# Patient Record
Sex: FEMALE | Race: ASIAN | NOT HISPANIC OR LATINO | Employment: FULL TIME | URBAN - METROPOLITAN AREA
[De-identification: names, ages, dates, MRNs, and addresses within clinical notes are randomized per-mention and may not be internally consistent; named-entity substitution may affect disease eponyms.]

---

## 2022-05-13 ENCOUNTER — APPOINTMENT (OUTPATIENT)
Dept: GENERAL RADIOLOGY | Facility: HOSPITAL | Age: 57
End: 2022-05-13

## 2022-05-13 ENCOUNTER — HOSPITAL ENCOUNTER (OUTPATIENT)
Facility: HOSPITAL | Age: 57
Setting detail: OBSERVATION
Discharge: HOME | End: 2022-05-16
Attending: EMERGENCY MEDICINE | Admitting: INTERNAL MEDICINE

## 2022-05-13 ENCOUNTER — OFFICE VISIT (OUTPATIENT)
Dept: INTERNAL MEDICINE | Age: 57
End: 2022-05-13

## 2022-05-13 ENCOUNTER — APPOINTMENT (OUTPATIENT)
Dept: CT IMAGING | Facility: HOSPITAL | Age: 57
End: 2022-05-13

## 2022-05-13 VITALS
TEMPERATURE: 98 F | HEART RATE: 110 BPM | HEIGHT: 59 IN | BODY MASS INDEX: 19.15 KG/M2 | DIASTOLIC BLOOD PRESSURE: 96 MMHG | OXYGEN SATURATION: 96 % | SYSTOLIC BLOOD PRESSURE: 144 MMHG | WEIGHT: 95 LBS

## 2022-05-13 DIAGNOSIS — F22 DELUSION: ICD-10-CM

## 2022-05-13 DIAGNOSIS — Z13.220 SCREENING FOR CHOLESTEROL LEVEL: ICD-10-CM

## 2022-05-13 DIAGNOSIS — R41.82 ALTERED MENTAL STATUS, UNSPECIFIED ALTERED MENTAL STATUS TYPE: Primary | ICD-10-CM

## 2022-05-13 DIAGNOSIS — E55.9 VITAMIN D DEFICIENCY: ICD-10-CM

## 2022-05-13 DIAGNOSIS — Z13.1 SCREENING FOR DIABETES MELLITUS: ICD-10-CM

## 2022-05-13 DIAGNOSIS — E11.65 UNCONTROLLED TYPE 2 DIABETES MELLITUS WITH HYPERGLYCEMIA: ICD-10-CM

## 2022-05-13 DIAGNOSIS — Z11.59 NEED FOR HEPATITIS C SCREENING TEST: ICD-10-CM

## 2022-05-13 DIAGNOSIS — R03.0 ELEVATED BP WITHOUT DIAGNOSIS OF HYPERTENSION: ICD-10-CM

## 2022-05-13 DIAGNOSIS — R44.3 HALLUCINATION: ICD-10-CM

## 2022-05-13 DIAGNOSIS — R44.0 AUDITORY HALLUCINATIONS: ICD-10-CM

## 2022-05-13 DIAGNOSIS — R00.0 TACHYCARDIA: Primary | ICD-10-CM

## 2022-05-13 LAB
ALBUMIN SERPL-MCNC: 4.8 G/DL (ref 3.5–5.2)
ALBUMIN/GLOB SERPL: 1.6 G/DL
ALP SERPL-CCNC: 101 U/L (ref 39–117)
ALT SERPL W P-5'-P-CCNC: 24 U/L (ref 1–33)
AMPHET+METHAMPHET UR QL: NEGATIVE
ANION GAP SERPL CALCULATED.3IONS-SCNC: 17.6 MMOL/L (ref 5–15)
AST SERPL-CCNC: 20 U/L (ref 1–32)
BACTERIA UR QL AUTO: NORMAL /HPF
BARBITURATES UR QL SCN: NEGATIVE
BASOPHILS # BLD AUTO: 0.04 10*3/MM3 (ref 0–0.2)
BASOPHILS NFR BLD AUTO: 0.4 % (ref 0–1.5)
BENZODIAZ UR QL SCN: NEGATIVE
BILIRUB SERPL-MCNC: 0.7 MG/DL (ref 0–1.2)
BILIRUB UR QL STRIP: NEGATIVE
BUN SERPL-MCNC: 12 MG/DL (ref 6–20)
BUN/CREAT SERPL: 18.2 (ref 7–25)
CALCIUM SPEC-SCNC: 10.3 MG/DL (ref 8.6–10.5)
CANNABINOIDS SERPL QL: NEGATIVE
CHLORIDE SERPL-SCNC: 102 MMOL/L (ref 98–107)
CLARITY UR: CLEAR
CO2 SERPL-SCNC: 19.4 MMOL/L (ref 22–29)
COCAINE UR QL: NEGATIVE
COLOR UR: YELLOW
CREAT SERPL-MCNC: 0.66 MG/DL (ref 0.57–1)
DEPRECATED RDW RBC AUTO: 37.3 FL (ref 37–54)
EGFRCR SERPLBLD CKD-EPI 2021: 101.8 ML/MIN/1.73
EOSINOPHIL # BLD AUTO: 0 10*3/MM3 (ref 0–0.4)
EOSINOPHIL NFR BLD AUTO: 0 % (ref 0.3–6.2)
ERYTHROCYTE [DISTWIDTH] IN BLOOD BY AUTOMATED COUNT: 12.2 % (ref 12.3–15.4)
ETHANOL BLD-MCNC: <10 MG/DL (ref 0–10)
ETHANOL UR QL: <0.01 %
GLOBULIN UR ELPH-MCNC: 3 GM/DL
GLUCOSE BLDC GLUCOMTR-MCNC: 299 MG/DL (ref 70–130)
GLUCOSE SERPL-MCNC: 359 MG/DL (ref 65–99)
GLUCOSE UR STRIP-MCNC: ABNORMAL MG/DL
HCT VFR BLD AUTO: 44.9 % (ref 34–46.6)
HGB BLD-MCNC: 15.2 G/DL (ref 12–15.9)
HGB UR QL STRIP.AUTO: NEGATIVE
HYALINE CASTS UR QL AUTO: NORMAL /LPF
IMM GRANULOCYTES # BLD AUTO: 0.04 10*3/MM3 (ref 0–0.05)
IMM GRANULOCYTES NFR BLD AUTO: 0.4 % (ref 0–0.5)
KETONES UR QL STRIP: ABNORMAL
LEUKOCYTE ESTERASE UR QL STRIP.AUTO: NEGATIVE
LYMPHOCYTES # BLD AUTO: 1.89 10*3/MM3 (ref 0.7–3.1)
LYMPHOCYTES NFR BLD AUTO: 20.2 % (ref 19.6–45.3)
MCH RBC QN AUTO: 28.6 PG (ref 26.6–33)
MCHC RBC AUTO-ENTMCNC: 33.9 G/DL (ref 31.5–35.7)
MCV RBC AUTO: 84.6 FL (ref 79–97)
METHADONE UR QL SCN: NEGATIVE
MONOCYTES # BLD AUTO: 0.67 10*3/MM3 (ref 0.1–0.9)
MONOCYTES NFR BLD AUTO: 7.2 % (ref 5–12)
NEUTROPHILS NFR BLD AUTO: 6.72 10*3/MM3 (ref 1.7–7)
NEUTROPHILS NFR BLD AUTO: 71.8 % (ref 42.7–76)
NITRITE UR QL STRIP: NEGATIVE
NRBC BLD AUTO-RTO: 0 /100 WBC (ref 0–0.2)
OPIATES UR QL: NEGATIVE
OXYCODONE UR QL SCN: NEGATIVE
PH UR STRIP.AUTO: 6 [PH] (ref 5–8)
PLATELET # BLD AUTO: 293 10*3/MM3 (ref 140–450)
PMV BLD AUTO: 9.3 FL (ref 6–12)
POTASSIUM SERPL-SCNC: 3.7 MMOL/L (ref 3.5–5.2)
PROT SERPL-MCNC: 7.8 G/DL (ref 6–8.5)
PROT UR QL STRIP: ABNORMAL
QT INTERVAL: 337 MS
RBC # BLD AUTO: 5.31 10*6/MM3 (ref 3.77–5.28)
RBC # UR STRIP: NORMAL /HPF
REF LAB TEST METHOD: NORMAL
SARS-COV-2 RNA PNL SPEC NAA+PROBE: NOT DETECTED
SODIUM SERPL-SCNC: 139 MMOL/L (ref 136–145)
SP GR UR STRIP: >=1.03 (ref 1–1.03)
SQUAMOUS #/AREA URNS HPF: NORMAL /HPF
TROPONIN T SERPL-MCNC: <0.01 NG/ML (ref 0–0.03)
UROBILINOGEN UR QL STRIP: ABNORMAL
WBC # UR STRIP: NORMAL /HPF
WBC NRBC COR # BLD: 9.36 10*3/MM3 (ref 3.4–10.8)

## 2022-05-13 PROCEDURE — 80307 DRUG TEST PRSMV CHEM ANLYZR: CPT | Performed by: PHYSICIAN ASSISTANT

## 2022-05-13 PROCEDURE — 63710000001 INSULIN REGULAR HUMAN PER 5 UNITS: Performed by: PHYSICIAN ASSISTANT

## 2022-05-13 PROCEDURE — 25010000002 LORAZEPAM PER 2 MG: Performed by: EMERGENCY MEDICINE

## 2022-05-13 PROCEDURE — 84484 ASSAY OF TROPONIN QUANT: CPT | Performed by: PHYSICIAN ASSISTANT

## 2022-05-13 PROCEDURE — 71045 X-RAY EXAM CHEST 1 VIEW: CPT

## 2022-05-13 PROCEDURE — 93010 ELECTROCARDIOGRAM REPORT: CPT | Performed by: INTERNAL MEDICINE

## 2022-05-13 PROCEDURE — 85025 COMPLETE CBC W/AUTO DIFF WBC: CPT | Performed by: PHYSICIAN ASSISTANT

## 2022-05-13 PROCEDURE — 96374 THER/PROPH/DIAG INJ IV PUSH: CPT

## 2022-05-13 PROCEDURE — 81001 URINALYSIS AUTO W/SCOPE: CPT | Performed by: PHYSICIAN ASSISTANT

## 2022-05-13 PROCEDURE — G0378 HOSPITAL OBSERVATION PER HR: HCPCS

## 2022-05-13 PROCEDURE — 99204 OFFICE O/P NEW MOD 45 MIN: CPT

## 2022-05-13 PROCEDURE — 70450 CT HEAD/BRAIN W/O DYE: CPT

## 2022-05-13 PROCEDURE — 87635 SARS-COV-2 COVID-19 AMP PRB: CPT | Performed by: PHYSICIAN ASSISTANT

## 2022-05-13 PROCEDURE — 93000 ELECTROCARDIOGRAM COMPLETE: CPT

## 2022-05-13 PROCEDURE — 99285 EMERGENCY DEPT VISIT HI MDM: CPT

## 2022-05-13 PROCEDURE — 82962 GLUCOSE BLOOD TEST: CPT

## 2022-05-13 PROCEDURE — 90791 PSYCH DIAGNOSTIC EVALUATION: CPT

## 2022-05-13 PROCEDURE — 80053 COMPREHEN METABOLIC PANEL: CPT | Performed by: PHYSICIAN ASSISTANT

## 2022-05-13 PROCEDURE — C9803 HOPD COVID-19 SPEC COLLECT: HCPCS

## 2022-05-13 PROCEDURE — 82077 ASSAY SPEC XCP UR&BREATH IA: CPT | Performed by: PHYSICIAN ASSISTANT

## 2022-05-13 PROCEDURE — 93005 ELECTROCARDIOGRAM TRACING: CPT | Performed by: PHYSICIAN ASSISTANT

## 2022-05-13 RX ORDER — DEXTROSE MONOHYDRATE 25 G/50ML
25 INJECTION, SOLUTION INTRAVENOUS
Status: DISCONTINUED | OUTPATIENT
Start: 2022-05-13 | End: 2022-05-16 | Stop reason: HOSPADM

## 2022-05-13 RX ORDER — LORAZEPAM 2 MG/ML
0.5 INJECTION INTRAMUSCULAR ONCE
Status: COMPLETED | OUTPATIENT
Start: 2022-05-13 | End: 2022-05-13

## 2022-05-13 RX ORDER — INSULIN LISPRO 100 [IU]/ML
0-9 INJECTION, SOLUTION INTRAVENOUS; SUBCUTANEOUS
Status: DISCONTINUED | OUTPATIENT
Start: 2022-05-14 | End: 2022-05-16 | Stop reason: HOSPADM

## 2022-05-13 RX ORDER — ACETAMINOPHEN 325 MG/1
650 TABLET ORAL EVERY 4 HOURS PRN
Status: DISCONTINUED | OUTPATIENT
Start: 2022-05-13 | End: 2022-05-16 | Stop reason: HOSPADM

## 2022-05-13 RX ORDER — SODIUM CHLORIDE 9 MG/ML
100 INJECTION, SOLUTION INTRAVENOUS CONTINUOUS
Status: DISCONTINUED | OUTPATIENT
Start: 2022-05-13 | End: 2022-05-16

## 2022-05-13 RX ORDER — NICOTINE POLACRILEX 4 MG
15 LOZENGE BUCCAL
Status: DISCONTINUED | OUTPATIENT
Start: 2022-05-13 | End: 2022-05-16 | Stop reason: HOSPADM

## 2022-05-13 RX ORDER — SODIUM CHLORIDE 0.9 % (FLUSH) 0.9 %
10 SYRINGE (ML) INJECTION AS NEEDED
Status: DISCONTINUED | OUTPATIENT
Start: 2022-05-13 | End: 2022-05-16 | Stop reason: HOSPADM

## 2022-05-13 RX ORDER — CALCIUM CARBONATE 200(500)MG
2 TABLET,CHEWABLE ORAL 2 TIMES DAILY PRN
Status: DISCONTINUED | OUTPATIENT
Start: 2022-05-13 | End: 2022-05-16 | Stop reason: HOSPADM

## 2022-05-13 RX ORDER — SODIUM CHLORIDE 0.9 % (FLUSH) 0.9 %
10 SYRINGE (ML) INJECTION EVERY 12 HOURS SCHEDULED
Status: DISCONTINUED | OUTPATIENT
Start: 2022-05-13 | End: 2022-05-16 | Stop reason: HOSPADM

## 2022-05-13 RX ORDER — ACETAMINOPHEN 650 MG/1
650 SUPPOSITORY RECTAL EVERY 4 HOURS PRN
Status: DISCONTINUED | OUTPATIENT
Start: 2022-05-13 | End: 2022-05-16 | Stop reason: HOSPADM

## 2022-05-13 RX ORDER — ONDANSETRON 4 MG/1
4 TABLET, FILM COATED ORAL EVERY 6 HOURS PRN
Status: DISCONTINUED | OUTPATIENT
Start: 2022-05-13 | End: 2022-05-16 | Stop reason: HOSPADM

## 2022-05-13 RX ORDER — ACETAMINOPHEN 160 MG/5ML
650 SOLUTION ORAL EVERY 4 HOURS PRN
Status: DISCONTINUED | OUTPATIENT
Start: 2022-05-13 | End: 2022-05-16 | Stop reason: HOSPADM

## 2022-05-13 RX ORDER — ONDANSETRON 2 MG/ML
4 INJECTION INTRAMUSCULAR; INTRAVENOUS EVERY 6 HOURS PRN
Status: DISCONTINUED | OUTPATIENT
Start: 2022-05-13 | End: 2022-05-16 | Stop reason: HOSPADM

## 2022-05-13 RX ADMIN — INSULIN HUMAN 5 UNITS: 100 INJECTION, SOLUTION PARENTERAL at 20:28

## 2022-05-13 RX ADMIN — LORAZEPAM 0.5 MG: 2 INJECTION INTRAMUSCULAR; INTRAVENOUS at 19:11

## 2022-05-13 RX ADMIN — SODIUM CHLORIDE 1000 ML: 9 INJECTION, SOLUTION INTRAVENOUS at 20:25

## 2022-05-13 NOTE — ED PROVIDER NOTES
EMERGENCY DEPARTMENT ENCOUNTER    Room Number:  02/02  Date seen:  5/13/2022  Time seen: 18:49 EDT  PCP: Bushra Garza APRN  Historian: Daughter and       HPI:  Chief Complaint: hallucinations    A complete HPI/ROS/PMH/PSH/SH/FH are unobtainable due to: none    Context: Mery Beltran is a 56 y.o. female who presents to the ED for evaluation of hallucinations that gradually started developing over the last 2 weeks.  No history of this.  Patient was working at [employer] and was talking about hearing her coworkers talking badly about her.  And his daughter and  further assess the situation they realized that the things that the coworkers were reportedly saying were very personal insecurities that the patient had, it is unlikely that they knew about these.  She ended up quitting her job.  Things have escalated with increasing hallucinations and paranoia.  Believes that her  is going to kill her.  She denies having any homicidal or suicidal ideations.  Daughter states that patient has said that when they come to kill her that she will take pills to do it herself instead.  No history of drug or alcohol use or psychiatric illness.  Patient has not seen a doctor in years, does not take any medications other than some OTC supplements.  Mother of patient did have dementia.  Patient was seen by PCP today and was referred to the ER for further evaluation.  She was noted to have some abnormalities on baseline EKG at that visit.        PAST MEDICAL HISTORY  Active Ambulatory Problems     Diagnosis Date Noted   • No Active Ambulatory Problems     Resolved Ambulatory Problems     Diagnosis Date Noted   • No Resolved Ambulatory Problems     No Additional Past Medical History         PAST SURGICAL HISTORY  No past surgical history on file.      FAMILY HISTORY  Family History   Problem Relation Age of Onset   • Heart disease Mother    • Dementia Mother    • Lung disease Father    • No Known Problems Sister    •  No Known Problems Brother    • No Known Problems Daughter    • No Known Problems Son    • No Known Problems Son          SOCIAL HISTORY  Social History     Socioeconomic History   • Marital status:    Tobacco Use   • Smoking status: Never Smoker   • Smokeless tobacco: Never Used   Substance and Sexual Activity   • Alcohol use: Never   • Drug use: Never         ALLERGIES  Patient has no known allergies.        REVIEW OF SYSTEMS  Review of Systems   All systems reviewed and negative except for those discussed in HPI.       PHYSICAL EXAM  ED Triage Vitals   Temp Heart Rate Resp BP SpO2   05/13/22 1608 05/13/22 1607 05/13/22 1607 05/13/22 1607 05/13/22 1607   97.5 °F (36.4 °C) 94 16 142/93 97 %      Temp src Heart Rate Source Patient Position BP Location FiO2 (%)   05/13/22 1608 05/13/22 1607 -- -- --   Tympanic Monitor            GENERAL: Mild distress  HENT: atraumatic  EYES: no scleral icterus  CV: regular rhythm, regular rate  RESPIRATORY: normal effort CTA B  ABDOMEN: soft, nontender nondistended  MUSCULOSKELETAL: no deformity  NEURO: alert, moves all extremities, follows commands  PSYCH: depressed mood and affect, tearful pressured clear speech denies SI, denies HI  SKIN: warm, dry    Vital signs and nursing notes reviewed.          LAB RESULTS  Recent Results (from the past 24 hour(s))   Comprehensive Metabolic Panel    Collection Time: 05/13/22  7:08 PM    Specimen: Blood   Result Value Ref Range    Glucose 359 (H) 65 - 99 mg/dL    BUN 12 6 - 20 mg/dL    Creatinine 0.66 0.57 - 1.00 mg/dL    Sodium 139 136 - 145 mmol/L    Potassium 3.7 3.5 - 5.2 mmol/L    Chloride 102 98 - 107 mmol/L    CO2 19.4 (L) 22.0 - 29.0 mmol/L    Calcium 10.3 8.6 - 10.5 mg/dL    Total Protein 7.8 6.0 - 8.5 g/dL    Albumin 4.80 3.50 - 5.20 g/dL    ALT (SGPT) 24 1 - 33 U/L    AST (SGOT) 20 1 - 32 U/L    Alkaline Phosphatase 101 39 - 117 U/L    Total Bilirubin 0.7 0.0 - 1.2 mg/dL    Globulin 3.0 gm/dL    A/G Ratio 1.6 g/dL     BUN/Creatinine Ratio 18.2 7.0 - 25.0    Anion Gap 17.6 (H) 5.0 - 15.0 mmol/L    eGFR 101.8 >60.0 mL/min/1.73   Troponin    Collection Time: 05/13/22  7:08 PM    Specimen: Blood   Result Value Ref Range    Troponin T <0.010 0.000 - 0.030 ng/mL   Ethanol    Collection Time: 05/13/22  7:08 PM    Specimen: Blood   Result Value Ref Range    Ethanol <10 0 - 10 mg/dL    Ethanol % <0.010 %   CBC Auto Differential    Collection Time: 05/13/22  7:08 PM    Specimen: Blood   Result Value Ref Range    WBC 9.36 3.40 - 10.80 10*3/mm3    RBC 5.31 (H) 3.77 - 5.28 10*6/mm3    Hemoglobin 15.2 12.0 - 15.9 g/dL    Hematocrit 44.9 34.0 - 46.6 %    MCV 84.6 79.0 - 97.0 fL    MCH 28.6 26.6 - 33.0 pg    MCHC 33.9 31.5 - 35.7 g/dL    RDW 12.2 (L) 12.3 - 15.4 %    RDW-SD 37.3 37.0 - 54.0 fl    MPV 9.3 6.0 - 12.0 fL    Platelets 293 140 - 450 10*3/mm3    Neutrophil % 71.8 42.7 - 76.0 %    Lymphocyte % 20.2 19.6 - 45.3 %    Monocyte % 7.2 5.0 - 12.0 %    Eosinophil % 0.0 (L) 0.3 - 6.2 %    Basophil % 0.4 0.0 - 1.5 %    Immature Grans % 0.4 0.0 - 0.5 %    Neutrophils, Absolute 6.72 1.70 - 7.00 10*3/mm3    Lymphocytes, Absolute 1.89 0.70 - 3.10 10*3/mm3    Monocytes, Absolute 0.67 0.10 - 0.90 10*3/mm3    Eosinophils, Absolute 0.00 0.00 - 0.40 10*3/mm3    Basophils, Absolute 0.04 0.00 - 0.20 10*3/mm3    Immature Grans, Absolute 0.04 0.00 - 0.05 10*3/mm3    nRBC 0.0 0.0 - 0.2 /100 WBC   ECG 12 Lead    Collection Time: 05/13/22  7:49 PM   Result Value Ref Range    QT Interval 337 ms   Urinalysis With Microscopic If Indicated (No Culture) - Urine, Clean Catch    Collection Time: 05/13/22  8:23 PM    Specimen: Urine, Clean Catch   Result Value Ref Range    Color, UA Yellow Yellow, Straw    Appearance, UA Clear Clear    pH, UA 6.0 5.0 - 8.0    Specific Gravity, UA >=1.030 1.005 - 1.030    Glucose, UA >=1000 mg/dL (3+) (A) Negative    Ketones, UA 80 mg/dL (3+) (A) Negative    Bilirubin, UA Negative Negative    Blood, UA Negative Negative    Protein, UA  30 mg/dL (1+) (A) Negative    Leuk Esterase, UA Negative Negative    Nitrite, UA Negative Negative    Urobilinogen, UA 1.0 E.U./dL 0.2 - 1.0 E.U./dL   Urine Drug Screen - Urine, Clean Catch    Collection Time: 05/13/22  8:23 PM    Specimen: Urine, Clean Catch   Result Value Ref Range    Amphet/Methamphet, Screen Negative Negative    Barbiturates Screen, Urine Negative Negative    Benzodiazepine Screen, Urine Negative Negative    Cocaine Screen, Urine Negative Negative    Opiate Screen Negative Negative    THC, Screen, Urine Negative Negative    Methadone Screen, Urine Negative Negative    Oxycodone Screen, Urine Negative Negative   Urinalysis, Microscopic Only - Urine, Clean Catch    Collection Time: 05/13/22  8:23 PM    Specimen: Urine, Clean Catch   Result Value Ref Range    RBC, UA 0-2 None Seen, 0-2 /HPF    WBC, UA 0-2 None Seen, 0-2 /HPF    Bacteria, UA None Seen None Seen /HPF    Squamous Epithelial Cells, UA 0-2 None Seen, 0-2 /HPF    Hyaline Casts, UA 0-2 None Seen /LPF    Methodology Automated Microscopy    POC Glucose Once    Collection Time: 05/13/22  9:55 PM    Specimen: Blood   Result Value Ref Range    Glucose 299 (H) 70 - 130 mg/dL       Ordered the above labs and independently reviewed the results.        RADIOLOGY  CT Head Without Contrast   Preliminary Result   1. No acute process.       Radiation dose reduction techniques were utilized, including automated   exposure control and exposure modulation based on body size.              XR Chest 1 View   Final Result          I ordered the above noted radiological studies. Reviewed by me and discussed with radiologist.  See dictation for official radiology interpretation.    PROCEDURES  Procedures        MEDICATIONS GIVEN IN ER  Medications   LORazepam (ATIVAN) injection 0.5 mg (0.5 mg Intravenous Given 5/13/22 1911)   sodium chloride 0.9 % bolus 1,000 mL (0 mL Intravenous Stopped 5/13/22 2147)   insulin regular (humuLIN R,novoLIN R) injection 5 Units  (5 Units Subcutaneous Given 5/13/22 2028)             PROGRESS AND CONSULTS    Differential diagnosis for altered mental status includes but is not limited to:  - vital sign abnormalities such as HTN encephalopathy, hypotension, hypoxemia, hypercarbia, heat stroke  - toxic/metabolic pathology such as hypoglycemia, DKA, hypo/hyper-natremia, thyroid storm, myxedema coma, medication side effect (either intentional or accidental)  - infectious etiology  - intracranial pathology such as stroke, seizure, intracranial mass, intracranial hemorrhage  - psychiatric pathology      ED Course as of 05/13/22 2315   Fri May 13, 2022   1852 Medical chart reviewed.  Patient presented to the PCP office today to establish care.  She was tachycardic, referred to the ER for further evaluation for this and by psychiatry for hallucinations. [KA]   1952 EKG independently viewed and contemporaneously interpreted by ED physician. Time: 1949.  Rate 104.  Interpretation: Sinus tachycardia, normal axis, normal QRS, no ST elevation or depression, T wave inversion in inferior leads as well as V3 through V6. [JG]   2006 Glucose(!): 359   states she was told she needed to start medication for high blood sugar at least 10 years ago but did not [KA]   2056 Psychiatry at bedside. [JG]   2118 Glucose(!): 359 [KA]   2118 CO2(!): 19.4 [KA]   2118 Anion Gap(!): 17.6 [KA]   2119 Ketones, UA(!): 80 mg/dL (3+) [KA]   2119 Glucose(!): >=1000 mg/dL (3+) [KA]   2212 I discussed the patient with MICHI Núñez for LHA.  We discussed the patient's history presentation labs and imaging and she agrees to admit for further evaluation and treatment. [KA]   2310 I reassessed the patient multiple times.  She is much improved after the Ativan.  I discussed all of her results with her and family.  Plan to admit for uncontrolled type 2 diabetes with hyperglycemia, ketonuria and psychiatric evaluation and they are agreeable.  I discussed patient with Randee  behavioral health RN.  She is evaluated patient at the bedside. [KA]      ED Course User Index  [JG] Eran Mcclellan MD  [KA] Denisse Key PA             Patient was placed in face mask in first look. Patient was wearing facemask each time I entered the room and throughout our encounter. I wore protective equipment throughout this patient encounter including a face mask, eye shield and gloves. Hand hygiene was performed before donning protective equipment and after removal when leaving the room.        DIAGNOSIS  Final diagnoses:   Altered mental status, unspecified altered mental status type   Hallucination   Uncontrolled type 2 diabetes mellitus with hyperglycemia (HCC)   Delusion (HCC)       Latest Documented Vital Signs:  As of 23:15 EDT  BP- 124/74 HR- 94 Temp- 97.5 °F (36.4 °C) (Tympanic) O2 sat- 97%       Denisse Key PA  05/13/22 7530

## 2022-05-13 NOTE — ED PROVIDER NOTES
MD ATTESTATION NOTE  I wore full protective equipment throughout this patient encounter including a N95 face mask, googles, gown and gloves. Hand hygiene was performed before donning protective equipment and after removal when leaving the room.    The JANE and I have discussed this patient's history, physical exam, and treatment plan. I have reviewed the documentation and personally had a face to face interaction with the patient. I affirm the JANE documentation and agree with their diagnostics, findings, treatment, plan, and disposition.    I provided a substantive portion of the care of this patient.  I personally performed the physical exam, in its entirety.  The attached note describes my personal findings.    Mery Beltran is a 56 y.o. female who presents to the ED c/o hallucination and delusions.  History supplied by patient and patient's family.  Patient has been having hallucinations for the last week.  Patient believes that people have been talking about her.  Patient has been having paranoid delusions, believing that her  is going to kill her, that there are cameras inside her house.  Family states that patient did state 2 days ago that she would take pills to kill her self to avoid having someone kill her but was just in often, and she it was only set once.  Patient was evaluated by [facility] psychiatry yesterday although family is unaware of what was done there other than patient was discharged.  Patient has no psychiatric history, no chronic medical problems.  Family does report that patient has some mild paranoia at baseline.    On exam:  General: NAD.  Patient resting comfortably when I enter the room, this is status post benzo.  Patient arouses to voice.  Head: NCAT.  ENT: nares patent, no scleral icterus  Neck: Supple, trachea midline.  Cardiac: regular rate and rhythm.  Lungs: normal effort.  Abdomen: Soft, NTTP.   Extremities: Moves all extremities well, no peripheral edema  Neuro: alert, MAEW,  follows commands  Psych: calm, cooperative  Skin: Warm, dry.    Medical Decision Making:  After the initial H&P, I discussed pertinent information from history and physical exam with patient/family.  Discussed differential diagnosis.  Discussed plan for ED evaluation/work-up/treatment.  All questions answered.  Patient/family is agreeable with plan.    ED Course as of 05/14/22 1305   Fri May 13, 2022   1852 Medical chart reviewed.  Patient presented to the PCP office today to establish care.  She was tachycardic, referred to the ER for further evaluation for this and by psychiatry for hallucinations. [KA]   1952 EKG independently viewed and contemporaneously interpreted by ED physician. Time: 1949.  Rate 104.  Interpretation: Sinus tachycardia, normal axis, normal QRS, no ST elevation or depression, T wave inversion in inferior leads as well as V3 through V6. [JG]   2006 Glucose(!): 359   states she was told she needed to start medication for high blood sugar at least 10 years ago but did not [KA]   2056 Psychiatry at bedside. [JG]   2118 Glucose(!): 359 [KA]   2118 CO2(!): 19.4 [KA]   2118 Anion Gap(!): 17.6 [KA]   2119 Ketones, UA(!): 80 mg/dL (3+) [KA]   2119 Glucose(!): >=1000 mg/dL (3+) [KA]   2212 I discussed the patient with MICHI Núñez for LHA.  We discussed the patient's history presentation labs and imaging and she agrees to admit for further evaluation and treatment. [KA]   2310 I reassessed the patient multiple times.  She is much improved after the Ativan.  I discussed all of her results with her and family.  Plan to admit for uncontrolled type 2 diabetes with hyperglycemia, ketonuria and psychiatric evaluation and they are agreeable.  I discussed patient with Randee behavioral health RN.  She is evaluated patient at the bedside. [KA]      ED Course User Index  [JG] Eran Mcclellan MD  [KA] Denisse Key PA       Diagnosis  Final diagnoses:   Altered mental status, unspecified  altered mental status type   Hallucination   Uncontrolled type 2 diabetes mellitus with hyperglycemia (HCC)   Delusion (HCC)        Eran Mcclellan MD  05/13/22 0773       Eran Mcclellan MD  05/14/22 9458

## 2022-05-13 NOTE — PROGRESS NOTES
"    I N T E R N A L  M E D I C I N E  Bushra Garza, APRN    ENCOUNTER DATE:  05/13/2022    Mery Beltran / 56 y.o. / female      CHIEF COMPLAINT / REASON FOR OFFICE VISIT     Establish Care      ASSESSMENT & PLAN     Diagnoses and all orders for this visit:    1. Tachycardia (Primary)  -     ECG 12 Lead  -     ECG 12 Lead    2. Auditory hallucinations  -     Ambulatory Referral to Behavioral Health    3. Elevated BP without diagnosis of hypertension  -     CBC & Differential  -     Comprehensive Metabolic Panel  -     TSH+Free T4  -     Urinalysis With Microscopic If Indicated (No Culture) - Urine, Clean Catch  -     Vitamin B12    4. Screening for diabetes mellitus  -     Hemoglobin A1c    5. Need for hepatitis C screening test  -     Hepatitis C Antibody    6. Screening for cholesterol level  -     Lipid Panel With / Chol / HDL Ratio    7. Vitamin D deficiency  -     Vitamin D 25 Hydroxy         SUMMARY/DISCUSSION  • Given pt's tachycardia, ST and T wave changes present on the EKG, pt is being immediately transferred to ED for full cardiac evaluation.  • Will need full mental health evaluation by psychiatry; referral placed.  Pt and family educated on importance of monitoring for any SI/ HI, and for need to seek care at ED if present.   • Will need to obtain follow up blood pressure readings and treat hypertension, if readings remain elevated.  • Lab work up pending - pt received labs prior to obtaining EKG and transfer to ED.  • Pt will need to follow up next week after ED work up and to obtain a full physical.      Next Appointment with me: Visit date not found    Return in about 3 days (around 5/16/2022).      VITAL SIGNS     Visit Vitals  /96 (BP Location: Left arm)   Pulse 110   Temp 98 °F (36.7 °C)   Ht 149.9 cm (59\")   Wt 43.1 kg (95 lb)   SpO2 96%   BMI 19.19 kg/m²           @BP@  Wt Readings from Last 3 Encounters:   05/13/22 43.1 kg (95 lb)     Body mass index is 19.19 kg/m².        MEDICATIONS AT " "THE TIME OF OFFICE VISIT     No current outpatient medications on file prior to visit.     No current facility-administered medications on file prior to visit.        HISTORY OF PRESENT ILLNESS     Here today s/p follow up visit to [facility] ED yesterday for auditory hallucinations x 2 weeks.  Pt was diagnosed with adjustment disorder and prescribed hydroxyzine.  Referred to [facility].  Pt does not want to go to [facility] because \"I feel fine.\"  She does not want to take any medication.  She repeatedly denies any SI/ HI.    Pt is accompanied to today's appointment by her  and daughter.  Pt is withdrawn, tearful, and unable provide history at this time.  Per family, 2 weeks ago pt began to report she heard people gossip about her at work.  She quit her job at [employer] because of the gossip.  She told her  that she was \"going crazy\" and that she heard voices say they were going to kill her.  Per daughter, in the last two weeks, she has been argumentative and defensive.    Has not received medical care in two decades.  No prior medical information available at today's appointment.  Pt denies any pain anywhere at today's appointment.  Denies headache, vision changes, numbness, tingling, weakness, chest pain, dyspnea.  She is tachycardic and hypertensive.  Daughter reports she is eating well and drinks a lot of water daily.        Patient Care Team:  Bushra Garza APRN as PCP - General (Family Medicine)    REVIEW OF SYSTEMS     Review of Systems   Constitutional: Negative for appetite change, chills, fatigue and unexpected weight change.   Respiratory: Negative for cough, chest tightness and shortness of breath.    Cardiovascular: Negative for chest pain, palpitations and leg swelling.   Neurological: Negative for dizziness, weakness and headaches.   Psychiatric/Behavioral: Positive for agitation, dysphoric mood and hallucinations. Negative for self-injury and suicidal ideas. The patient is " nervous/anxious.           PHYSICAL EXAMINATION     Physical Exam  Vitals reviewed.   Constitutional:       General: She is not in acute distress.     Appearance: Normal appearance. She is not ill-appearing, toxic-appearing or diaphoretic.   HENT:      Head: Normocephalic and atraumatic.      Nose: Nose normal.   Eyes:      Conjunctiva/sclera: Conjunctivae normal.      Pupils: Pupils are equal, round, and reactive to light.   Cardiovascular:      Rate and Rhythm: Normal rate and regular rhythm.      Heart sounds: Normal heart sounds.   Pulmonary:      Effort: Pulmonary effort is normal.      Breath sounds: Normal breath sounds.   Abdominal:      General: Bowel sounds are normal.      Palpations: Abdomen is soft.   Musculoskeletal:         General: No swelling or deformity.   Skin:     General: Skin is warm and dry.   Neurological:      Mental Status: She is alert and oriented to person, place, and time. Mental status is at baseline.   Psychiatric:         Attention and Perception: She perceives auditory hallucinations. She does not perceive visual hallucinations.         Mood and Affect: Mood is anxious and depressed. Affect is flat and tearful.         Speech: Speech is delayed.         Behavior: Behavior is slowed and withdrawn. Behavior is cooperative.         Thought Content: Thought content is paranoid. Thought content does not include homicidal or suicidal ideation. Thought content does not include homicidal or suicidal plan.           REVIEWED DATA     Labs:           Imaging:       ECG 12 Lead    Date/Time: 5/13/2022 4:00 PM  Performed by: Bushra Garza APRN  Authorized by: Bushra Garza APRN   Comparison: not compared with previous ECG   Rhythm: sinus tachycardia    Clinical impression: abnormal EKG and poor quality tracing that precludes reliable interpretation - Repeat ECG  Comments: Sinus tachycardia with T wave/ ST changes present.  Will need to repeat EKG in ED.              Medical Tests:            Summary of old records / correspondence / consultant report:           Request outside records:

## 2022-05-13 NOTE — ED TRIAGE NOTES
Pt arrived from [facility] office accompanied by daughter. Daughter reports pt has had auditory hallucinations x 2wks.     Pt was wearing a mask during assessment.  This RN wore appropriate PPE

## 2022-05-14 PROBLEM — R44.0 AUDITORY HALLUCINATIONS: Status: ACTIVE | Noted: 2022-05-14

## 2022-05-14 PROBLEM — E11.65 TYPE 2 DIABETES MELLITUS WITH HYPERGLYCEMIA: Status: ACTIVE | Noted: 2022-05-14

## 2022-05-14 LAB
25(OH)D3+25(OH)D2 SERPL-MCNC: 23.4 NG/ML (ref 30–100)
ALBUMIN SERPL-MCNC: 5 G/DL (ref 3.8–4.9)
ALBUMIN/GLOB SERPL: 2.2 {RATIO} (ref 1.2–2.2)
ALP SERPL-CCNC: 110 IU/L (ref 44–121)
ALT SERPL-CCNC: 25 IU/L (ref 0–32)
AMMONIA BLD-SCNC: 30 UMOL/L (ref 11–51)
AMPHET+METHAMPHET UR QL: NEGATIVE
ANION GAP SERPL CALCULATED.3IONS-SCNC: 12 MMOL/L (ref 5–15)
APPEARANCE UR: CLEAR
AST SERPL-CCNC: 21 IU/L (ref 0–40)
BARBITURATES UR QL SCN: NEGATIVE
BASOPHILS # BLD AUTO: 0 X10E3/UL (ref 0–0.2)
BASOPHILS NFR BLD AUTO: 1 %
BENZODIAZ UR QL SCN: NEGATIVE
BILIRUB SERPL-MCNC: 0.6 MG/DL (ref 0–1.2)
BILIRUB UR QL STRIP: NEGATIVE
BUN SERPL-MCNC: 11 MG/DL (ref 6–24)
BUN SERPL-MCNC: 15 MG/DL (ref 6–20)
BUN/CREAT SERPL: 17 (ref 9–23)
BUN/CREAT SERPL: 25.9 (ref 7–25)
CALCIUM SERPL-MCNC: 10.2 MG/DL (ref 8.7–10.2)
CALCIUM SPEC-SCNC: 9.2 MG/DL (ref 8.6–10.5)
CANNABINOIDS SERPL QL: NEGATIVE
CHLORIDE SERPL-SCNC: 100 MMOL/L (ref 96–106)
CHLORIDE SERPL-SCNC: 107 MMOL/L (ref 98–107)
CHOLEST SERPL-MCNC: 310 MG/DL (ref 100–199)
CHOLEST/HDLC SERPL: 5.5 RATIO (ref 0–4.4)
CO2 SERPL-SCNC: 21 MMOL/L (ref 20–29)
CO2 SERPL-SCNC: 26 MMOL/L (ref 22–29)
COCAINE UR QL: NEGATIVE
COLOR UR: YELLOW
CREAT SERPL-MCNC: 0.58 MG/DL (ref 0.57–1)
CREAT SERPL-MCNC: 0.66 MG/DL (ref 0.57–1)
DEPRECATED RDW RBC AUTO: 40.4 FL (ref 37–54)
EGFRCR SERPLBLD CKD-EPI 2021: 102 ML/MIN/1.73
EGFRCR SERPLBLD CKD-EPI 2021: 105 ML/MIN/1.73
EOSINOPHIL # BLD AUTO: 0 X10E3/UL (ref 0–0.4)
EOSINOPHIL NFR BLD AUTO: 0 %
ERYTHROCYTE [DISTWIDTH] IN BLOOD BY AUTOMATED COUNT: 12.3 % (ref 11.7–15.4)
ERYTHROCYTE [DISTWIDTH] IN BLOOD BY AUTOMATED COUNT: 12.5 % (ref 12.3–15.4)
FOLATE SERPL-MCNC: 15.4 NG/ML (ref 4.78–24.2)
GLOBULIN SER CALC-MCNC: 2.3 G/DL (ref 1.5–4.5)
GLUCOSE BLDC GLUCOMTR-MCNC: 148 MG/DL (ref 70–130)
GLUCOSE BLDC GLUCOMTR-MCNC: 180 MG/DL (ref 70–130)
GLUCOSE BLDC GLUCOMTR-MCNC: 280 MG/DL (ref 70–130)
GLUCOSE SERPL-MCNC: 293 MG/DL (ref 65–99)
GLUCOSE SERPL-MCNC: 344 MG/DL (ref 65–99)
GLUCOSE UR QL STRIP: ABNORMAL
HBA1C MFR BLD: 10.5 % (ref 4.8–5.6)
HBA1C MFR BLD: 10.8 % (ref 4.8–5.6)
HCT VFR BLD AUTO: 41 % (ref 34–46.6)
HCT VFR BLD AUTO: 44.2 % (ref 34–46.6)
HCV AB S/CO SERPL IA: <0.1 S/CO RATIO (ref 0–0.9)
HDLC SERPL-MCNC: 56 MG/DL
HGB BLD-MCNC: 13.1 G/DL (ref 12–15.9)
HGB BLD-MCNC: 14.8 G/DL (ref 11.1–15.9)
HGB UR QL STRIP: NEGATIVE
IMM GRANULOCYTES # BLD AUTO: 0 X10E3/UL (ref 0–0.1)
IMM GRANULOCYTES NFR BLD AUTO: 0 %
KETONES UR QL STRIP: ABNORMAL
LDLC SERPL CALC-MCNC: 239 MG/DL (ref 0–99)
LEUKOCYTE ESTERASE UR QL STRIP: NEGATIVE
LYMPHOCYTES # BLD AUTO: 1.3 X10E3/UL (ref 0.7–3.1)
LYMPHOCYTES NFR BLD AUTO: 17 %
MAGNESIUM SERPL-MCNC: 2.1 MG/DL (ref 1.6–2.6)
MCH RBC QN AUTO: 28.1 PG (ref 26.6–33)
MCH RBC QN AUTO: 28.3 PG (ref 26.6–33)
MCHC RBC AUTO-ENTMCNC: 32 G/DL (ref 31.5–35.7)
MCHC RBC AUTO-ENTMCNC: 33.5 G/DL (ref 31.5–35.7)
MCV RBC AUTO: 85 FL (ref 79–97)
MCV RBC AUTO: 87.8 FL (ref 79–97)
METHADONE UR QL SCN: NEGATIVE
MICRO URNS: ABNORMAL
MONOCYTES # BLD AUTO: 0.5 X10E3/UL (ref 0.1–0.9)
MONOCYTES NFR BLD AUTO: 7 %
NEUTROPHILS # BLD AUTO: 5.8 X10E3/UL (ref 1.4–7)
NEUTROPHILS NFR BLD AUTO: 75 %
NITRITE UR QL STRIP: NEGATIVE
OPIATES UR QL: NEGATIVE
OXYCODONE UR QL SCN: NEGATIVE
PH UR STRIP: 6 [PH] (ref 5–7.5)
PLATELET # BLD AUTO: 196 10*3/MM3 (ref 140–450)
PLATELET # BLD AUTO: 274 X10E3/UL (ref 150–450)
PMV BLD AUTO: 9.1 FL (ref 6–12)
POTASSIUM SERPL-SCNC: 3.3 MMOL/L (ref 3.5–5.2)
POTASSIUM SERPL-SCNC: 3.8 MMOL/L (ref 3.5–5.2)
PROT SERPL-MCNC: 7.3 G/DL (ref 6–8.5)
PROT UR QL STRIP: ABNORMAL
RBC # BLD AUTO: 4.67 10*6/MM3 (ref 3.77–5.28)
RBC # BLD AUTO: 5.23 X10E6/UL (ref 3.77–5.28)
RPR SER QL: NORMAL
SODIUM SERPL-SCNC: 142 MMOL/L (ref 134–144)
SODIUM SERPL-SCNC: 145 MMOL/L (ref 136–145)
SP GR UR STRIP: >=1.03 (ref 1–1.03)
T4 FREE SERPL-MCNC: 1.39 NG/DL (ref 0.82–1.77)
TRIGL SERPL-MCNC: 89 MG/DL (ref 0–149)
TSH SERPL DL<=0.005 MIU/L-ACNC: 3.24 UIU/ML (ref 0.45–4.5)
UROBILINOGEN UR STRIP-MCNC: 0.2 MG/DL (ref 0.2–1)
VIT B12 SERPL-MCNC: 1314 PG/ML (ref 232–1245)
VLDLC SERPL CALC-MCNC: 15 MG/DL (ref 5–40)
WBC # BLD AUTO: 7.7 X10E3/UL (ref 3.4–10.8)
WBC NRBC COR # BLD: 9.59 10*3/MM3 (ref 3.4–10.8)

## 2022-05-14 PROCEDURE — 86592 SYPHILIS TEST NON-TREP QUAL: CPT | Performed by: INTERNAL MEDICINE

## 2022-05-14 PROCEDURE — 80307 DRUG TEST PRSMV CHEM ANLYZR: CPT | Performed by: NURSE PRACTITIONER

## 2022-05-14 PROCEDURE — 82962 GLUCOSE BLOOD TEST: CPT

## 2022-05-14 PROCEDURE — 83036 HEMOGLOBIN GLYCOSYLATED A1C: CPT | Performed by: NURSE PRACTITIONER

## 2022-05-14 PROCEDURE — 83735 ASSAY OF MAGNESIUM: CPT | Performed by: INTERNAL MEDICINE

## 2022-05-14 PROCEDURE — 63710000001 INSULIN LISPRO (HUMAN) PER 5 UNITS: Performed by: NURSE PRACTITIONER

## 2022-05-14 PROCEDURE — G0378 HOSPITAL OBSERVATION PER HR: HCPCS

## 2022-05-14 PROCEDURE — 99214 OFFICE O/P EST MOD 30 MIN: CPT | Performed by: STUDENT IN AN ORGANIZED HEALTH CARE EDUCATION/TRAINING PROGRAM

## 2022-05-14 PROCEDURE — 80048 BASIC METABOLIC PNL TOTAL CA: CPT | Performed by: NURSE PRACTITIONER

## 2022-05-14 PROCEDURE — 96376 TX/PRO/DX INJ SAME DRUG ADON: CPT

## 2022-05-14 PROCEDURE — 82746 ASSAY OF FOLIC ACID SERUM: CPT | Performed by: INTERNAL MEDICINE

## 2022-05-14 PROCEDURE — 96361 HYDRATE IV INFUSION ADD-ON: CPT

## 2022-05-14 PROCEDURE — 36415 COLL VENOUS BLD VENIPUNCTURE: CPT | Performed by: NURSE PRACTITIONER

## 2022-05-14 PROCEDURE — 82140 ASSAY OF AMMONIA: CPT | Performed by: INTERNAL MEDICINE

## 2022-05-14 PROCEDURE — 25010000002 LORAZEPAM PER 2 MG: Performed by: NURSE PRACTITIONER

## 2022-05-14 PROCEDURE — 85027 COMPLETE CBC AUTOMATED: CPT | Performed by: NURSE PRACTITIONER

## 2022-05-14 RX ORDER — POTASSIUM CHLORIDE 1.5 G/1.77G
40 POWDER, FOR SOLUTION ORAL AS NEEDED
Status: DISCONTINUED | OUTPATIENT
Start: 2022-05-14 | End: 2022-05-16 | Stop reason: HOSPADM

## 2022-05-14 RX ORDER — POTASSIUM CHLORIDE 750 MG/1
40 TABLET, FILM COATED, EXTENDED RELEASE ORAL AS NEEDED
Status: DISCONTINUED | OUTPATIENT
Start: 2022-05-14 | End: 2022-05-16 | Stop reason: HOSPADM

## 2022-05-14 RX ORDER — DOCUSATE SODIUM 100 MG/1
200 CAPSULE, LIQUID FILLED ORAL 2 TIMES DAILY
Status: DISCONTINUED | OUTPATIENT
Start: 2022-05-14 | End: 2022-05-16 | Stop reason: HOSPADM

## 2022-05-14 RX ORDER — MAGNESIUM SULFATE HEPTAHYDRATE 40 MG/ML
2 INJECTION, SOLUTION INTRAVENOUS AS NEEDED
Status: DISCONTINUED | OUTPATIENT
Start: 2022-05-14 | End: 2022-05-16 | Stop reason: HOSPADM

## 2022-05-14 RX ORDER — LORAZEPAM 2 MG/ML
1 INJECTION INTRAMUSCULAR EVERY 4 HOURS PRN
Status: DISCONTINUED | OUTPATIENT
Start: 2022-05-14 | End: 2022-05-16 | Stop reason: HOSPADM

## 2022-05-14 RX ORDER — OLANZAPINE 5 MG/1
5 TABLET ORAL NIGHTLY
Status: DISCONTINUED | OUTPATIENT
Start: 2022-05-14 | End: 2022-05-16 | Stop reason: HOSPADM

## 2022-05-14 RX ORDER — BISACODYL 10 MG
10 SUPPOSITORY, RECTAL RECTAL DAILY PRN
Status: DISCONTINUED | OUTPATIENT
Start: 2022-05-14 | End: 2022-05-16 | Stop reason: HOSPADM

## 2022-05-14 RX ORDER — POLYETHYLENE GLYCOL 3350 17 G/17G
17 POWDER, FOR SOLUTION ORAL DAILY
Status: DISCONTINUED | OUTPATIENT
Start: 2022-05-14 | End: 2022-05-16 | Stop reason: HOSPADM

## 2022-05-14 RX ORDER — MAGNESIUM SULFATE HEPTAHYDRATE 40 MG/ML
4 INJECTION, SOLUTION INTRAVENOUS AS NEEDED
Status: DISCONTINUED | OUTPATIENT
Start: 2022-05-14 | End: 2022-05-16 | Stop reason: HOSPADM

## 2022-05-14 RX ADMIN — INSULIN LISPRO 6 UNITS: 100 INJECTION, SOLUTION INTRAVENOUS; SUBCUTANEOUS at 11:34

## 2022-05-14 RX ADMIN — POLYETHYLENE GLYCOL 3350 17 G: 17 POWDER, FOR SOLUTION ORAL at 13:20

## 2022-05-14 RX ADMIN — POTASSIUM CHLORIDE 40 MEQ: 10 TABLET, EXTENDED RELEASE ORAL at 20:00

## 2022-05-14 RX ADMIN — SODIUM CHLORIDE 100 ML/HR: 9 INJECTION, SOLUTION INTRAVENOUS at 10:33

## 2022-05-14 RX ADMIN — INSULIN LISPRO 2 UNITS: 100 INJECTION, SOLUTION INTRAVENOUS; SUBCUTANEOUS at 21:43

## 2022-05-14 RX ADMIN — INSULIN LISPRO 6 UNITS: 100 INJECTION, SOLUTION INTRAVENOUS; SUBCUTANEOUS at 08:47

## 2022-05-14 RX ADMIN — DOCUSATE SODIUM 200 MG: 100 CAPSULE, LIQUID FILLED ORAL at 13:20

## 2022-05-14 RX ADMIN — LORAZEPAM 1 MG: 2 INJECTION INTRAMUSCULAR; INTRAVENOUS at 19:53

## 2022-05-14 RX ADMIN — SODIUM CHLORIDE 100 ML/HR: 9 INJECTION, SOLUTION INTRAVENOUS at 19:58

## 2022-05-14 RX ADMIN — LORAZEPAM 1 MG: 2 INJECTION INTRAMUSCULAR; INTRAVENOUS at 03:20

## 2022-05-14 RX ADMIN — SODIUM CHLORIDE 100 ML/HR: 9 INJECTION, SOLUTION INTRAVENOUS at 02:57

## 2022-05-14 RX ADMIN — Medication 10 ML: at 02:58

## 2022-05-14 RX ADMIN — OLANZAPINE 5 MG: 5 TABLET ORAL at 20:04

## 2022-05-14 NOTE — CONSULTS
"Pt seen by [facility] for psychosis.     Pt is alert and oriented. Pt is accompanied by her  and daughter who stay in the room during interview with pt consent.    Pt seems weary of this interviewer and seems internally preoccupied. Pt's speech is somewhat delayed and low in volume. Pt's family provided most of the information for this interview with pt consent.      Pt's family reports that she has developed new \"paranoid\" behaviors within the last 2 weeks. Family reports the pt began telling them about co-workers the pt believed were talking badly about her. The family soon realized the pt was describing her co-workers knowing very personal details about the pt that no one else would know. Pt's daughter states \"She was telling us they were saying things about her at work that they could not possibly know\".   Pt's family reports the pt has become very suspicious of their neighbors and her  stating \"my dad waved at a neighbor and my mom thought they were having an affair\".  Pt's family reports that she becomes increasingly paranoid at night and is \"convinced someone is going to kill her at night\". Pt's family reports that the pt is \"convinced there is cameras in the house too\".   Pt reports she hears voices, specifically a Ivorian voice. The pt does not disclose any further information when pressed about this voice but reports it is negative. Pt says she remembers hearing voices as a child approximately 8 years old but did not answer any further questions about this. Pt's family did not report noticing any symptoms like these ever before 2 weeks ago.     Pt's family reports the pt has refused going to any appointments they have made for help with these new symptoms. Pt's family recently took her to [facility] but that she was discharged quickly with \"anxiety medications\" that her family reports the pt refuses to take because she believes her family is trying to kill her.    The pt is from the [place] and [place]  Pt has 3 children, with 2 children from her previous marriage. Pt does not have contact with the 2 children from her previous marriage.  Pt lives with her  and their daughter is staying with them currently because of the pts new condition. Pt worked at [employer] but has not worked for the last 2 weeks due to these new issues. Pt likes to walk and play [employer]ce[employer].        No psychiatric history. No current medications.  Denies history of physical, mental, or sexual abuse. Denies history of trauma.     Appearance: Clean, well kempt  Mood: \"I am ok\"  Affect: Blunted   Behavior: Pt is lying on their side. Pt remains calm and cooperative through out interview  Speech: Low volume, and rate  Pt denies SI/HI.   Pt endorses AH and seems internally preoccupied evidenced by delayed responses to this interviewer and pt seeming distracted through out interview.   Pt denies VH.  Thought process: Delayed, thought blocking   Appetite is decreased with unintentional weight loss.   Energy: decreased due to decreased sleep   Sleep: Decreased due to increasing paranoia at night  Denies symptoms of panic, guilt. Denies somatic symptoms. Denies drug and alcohol use. Denies self injurious behaviors.    [facility] will continue to follow pt and provide resources. Family would like these resources after speaking with psychiatrist.  Psychiatrist to see tomorrow.         "

## 2022-05-14 NOTE — PLAN OF CARE
"  Problem: Adult Inpatient Plan of Care  Goal: Patient-Specific Goal (Individualized)  Outcome: Ongoing, Progressing   Goal Outcome Evaluation:  Plan of Care Reviewed With: patient        Progress: improving  Outcome Evaluation: Pt tachycardic with exertion and anxiety. Upon arrival to the unit the patient was calm and cooperative. Staff was able to complete admission questionarre before the patient had what was believed to be a panic or anxiety attack. The patient stated yelling and crying out hysterically. Stating \"I don't want to die! Take me to the [facility]!\" Pts  and daughter proceeded to assist the patient in trying to calm down. Pt is very suspicious of hospital staff and equipment. IV ativan was ordered and administered. Ativan effective. Pt asleep in bed resting peacefully with her eyes closed at this time. Awaiting psych consult. Will pass information on to day shift nursing staff and continue to monitor.  "

## 2022-05-14 NOTE — CONSULTS
"Neurology Consult Note    Consult Date: 5/14/2022    Referring MD: Haseeb Mckeon MD    Reason for Consult I have been asked to see the patient in neurological consultation to render advice and opinion regarding auditory hallucination and paranoia.    Mery Beltran is a 56 y.o. right-handed [place] female with no significant past medical history presented to the hospital with acute onset of severe depression associated with auditory hallucination and paranoia symptoms started 2 weeks ago not associated with focal weakness, numbness, memory issues.  During this time she would actively hear multiple people talking to her and she is convinced that they are telling her the truth, she denied suicidal ideation or attempts.  She did not tell me what they were telling her but she denied thoughts about hurting herself or hurting other people.  Her daughter also mentioned that she got really paranoid during this time and thinks people at work talking bad about her.  She works at [employer].  Her symptoms affected her walking, sleeping, eating with decreased appetite.  Her urine drug screen was negative and alcohol level was negative.  She denied history of drug abuse.  She denied family history of schizophrenia or depression or neurological disorder.    History reviewed. No pertinent past medical history.    ROS:  + Auditory hallucination paranoia  + Severe depression, no anxiety  + Decrease in sleep  + Fatigue  + Poor appetite  Denied suicidal or homicidal ideation/attempts.  No fevers, chills  No weakness, numbness  No diarrhea nausea or vomiting  No chest pain, palpitation or shortness of breath    All other symptoms reviewed and they are negative    Exam  /69 (BP Location: Left arm, Patient Position: Lying)   Pulse 106   Temp 98.3 °F (36.8 °C) (Oral)   Resp 17   Ht 149.9 cm (59\")   Wt 43.1 kg (95 lb)   SpO2 98%   BMI 19.19 kg/m²   Gen: Flat affect, vitals reviewed  MS: oriented x3, recent/remote memory grossly " "intact, fair attention/concentration, delayed thinking, fair judgment language intact, no neglect.  CN: visual acuity grossly normal, PERRL, EOMI, no facial droop, no dysarthria  Motor: 5/5 throughout upper and lower extremities, normal tone  Sensory exam: Normal to light touch throughout  Coordination: Normal finger-nose-finger bilaterally  Reflexes: 2+ throughout with downgoing plantars  Gait: Normal    DATA:    Lab Results   Component Value Date    GLUCOSE 293 (H) 05/14/2022    CALCIUM 9.2 05/14/2022     05/14/2022    K 3.3 (L) 05/14/2022    CO2 26.0 05/14/2022     05/14/2022    BUN 15 05/14/2022    CREATININE 0.58 05/14/2022    BCR 25.9 (H) 05/14/2022    ANIONGAP 12.0 05/14/2022     Lab Results   Component Value Date    WBC 9.59 05/14/2022    HGB 13.1 05/14/2022    HCT 41.0 05/14/2022    MCV 87.8 05/14/2022     05/14/2022       Lab review: Alcohol level negative, urine drug screen negative, vitamin B12 1, 314, TSH within normal limits.    Imaging review: I personally reviewed CT head which showed no acute abnormality.    Diagnoses:  -Acute paranoia with auditory hallucination likely psychiatric in origin this could be due to severe depression with psychosis versus acute psychotic event.  -Rule out limbic encephalitis although felt to be less likely      PLAN:   1.  We will get MRI brain with and without contrast if negative no further work-up from neurology standpoint  2.  Management of severe depression/acute psychosis as per psychiatry.    Will follow peripherally on the MRI brain if negative no further work-up needed.    And discussed with Dr. Degroot, patient and daughter.    \"Dictated utilizing Dragon dictation\".      "

## 2022-05-14 NOTE — CONSULTS
"Requesting Provider: Emily Mccurdy  Reason for Consult: Hallucinations    Date of admission: May 13, 2022  Date of assessment: May 14, 2022    Chief Complaint: None given    History of presenting illness: Patient is a 56 y.o. female with no known past psychiatric history seen on consultation for new onset hallucinations and delusions.  The patient had presented to the ED with family complaining of having hallucinations and delusions for the past 2 weeks.  She has been paranoid, thinking others are talking about her and that there are cameras in her home.  Family reported that the patient did state 2 days prior to presentation that she would take pills to kill herself.  Essentially, she indicated that she would rather hurt herself and let someone else hurt her.  She has also been hearing voices, specifically a Moldovan voice.  She was reportedly evaluated at [facility] yesterday, but discharged home.  The patient became agitated last night and required IV Ativan, which was effective.    The patient is seen with her daughter at bedside.  The patient has no history of past inpatient psychiatric treatment.  She has no past outpatient treatment.  There is no past history of suicide attempts.  She has never been treated for depression or anxiety.  The patient's daughter indicates that she was \"just fine\" 2 weeks ago.  Medical work-up has found no evidence of infection.  She does have newly diagnosed diabetes mellitus.  She did present with significant hyperglycemia and her hemoglobin A1c was 10.8.  The patient is much better today.  She is still having some hallucinations.  She has not been as paranoid or delusional.    Past psychiatric history: See HPI    Past medical history:  Diagnoses: Diabetes mellitus  Medications:   .  Current Facility-Administered Medications   Medication Dose Route Frequency Provider Last Rate Last Admin   • acetaminophen (TYLENOL) tablet 650 mg  650 mg Oral Q4H PRN Allen, " MICHI Sanches        Or   • acetaminophen (TYLENOL) 160 MG/5ML solution 650 mg  650 mg Oral Q4H PRN Karin Villa APRN        Or   • acetaminophen (TYLENOL) suppository 650 mg  650 mg Rectal Q4H PRN Karin Villa APRN       • bisacodyl (DULCOLAX) suppository 10 mg  10 mg Rectal Daily PRN Emily Mccurdy APRN       • calcium carbonate (TUMS) chewable tablet 500 mg (200 mg elemental)  2 tablet Oral BID PRN Karin Villa APRN       • dextrose (D50W) (25 g/50 mL) IV injection 25 g  25 g Intravenous Q15 Min PRN Karin Villa APRN       • dextrose (GLUTOSE) oral gel 15 g  15 g Oral Q15 Min PRN Karin Villa APRN       • docusate sodium (COLACE) capsule 200 mg  200 mg Oral BID Jesica Flores MD   200 mg at 05/14/22 1320   • glucagon (human recombinant) (GLUCAGEN DIAGNOSTIC) injection 1 mg  1 mg Subcutaneous PRN Karin Villa APRN       • insulin lispro (ADMELOG) injection 0-9 Units  0-9 Units Subcutaneous 4x Daily With Meals & Nightly Karin Villa APRN   6 Units at 05/14/22 1134   • LORazepam (ATIVAN) injection 1 mg  1 mg Intravenous Q4H PRN Shanice Crisostomo APRN   1 mg at 05/14/22 0320   • Magnesium Sulfate 2 gram Bolus, followed by 8 gram infusion (total Mg dose 10 grams)- Mg less than or equal to 1mg/dL  2 g Intravenous PRN Jesica Flores MD        Or   • Magnesium Sulfate 2 gram / 50mL Infusion (GIVE X 3 BAGS TO EQUAL 6GM TOTAL DOSE) - Mg 1.1 - 1.5 mg/dl  2 g Intravenous PRN Jesica Flores MD        Or   • Magnesium Sulfate 4 gram infusion- Mg 1.6-1.9 mg/dL  4 g Intravenous PRN Jesica Flores MD       • ondansetron (ZOFRAN) tablet 4 mg  4 mg Oral Q6H PRN Karin Villa APRN        Or   • ondansetron (ZOFRAN) injection 4 mg  4 mg Intravenous Q6H PRN Karin Villa APRN       • polyethylene glycol (MIRALAX) packet 17 g  17 g Oral Daily Emily Mccurdy APRN   17 g at 05/14/22 1320   • potassium chloride  "(K-DUR,KLOR-CON) ER tablet 40 mEq  40 mEq Oral PRN Jesica Flores MD       • potassium chloride (KLOR-CON) packet 40 mEq  40 mEq Oral PRN Jesica Flores MD       • sodium chloride 0.9 % flush 10 mL  10 mL Intravenous Q12H Karin Vilal APRN   10 mL at 05/14/22 0258   • sodium chloride 0.9 % flush 10 mL  10 mL Intravenous PRN Karin Villa APRN       • sodium chloride 0.9 % infusion  100 mL/hr Intravenous Continuous Karin Villa APRN 100 mL/hr at 05/14/22 1033 100 mL/hr at 05/14/22 1033     Medication allergies:  NKDA    Social history: Noncontributory    Family history: Noncontributory    Substance abuse history: None    Vital Signs    Temp:  98.3  Heart Rate: 106   Resp:  17  BP:  109/69    Mental Status Exam: The patient is found lying in bed.  Daughter is at bedside.  She is awake and alert. She is dressed in hospital attire and wearing corrective lenses. She is oriented times 3.  Speech is soft, decreased tone and volume.  Mood is \"better.\"  Affect congruent.  She is a bit guarded.  She denies any acute suicidal ideation or homicidal ideations.  She continues to have audiovisual hallucinations.  Thought processes are circumstantial.  Judgment and insight are impaired.  Memory is intact.    Assessment:   Psychotic disorder, not otherwise specified;  Rule out Delirium secondary to hyperglycemia.    Treatment Plan:     This patient's history and presentation is not consistent with an affective disorder.  She has no past history of depression, anxiety, hipolito or psychosis.  She seems to be improving today with the reduction in her blood sugar.  I will initiate Zyprexa 5 mg at bedtime for the psychosis.  She will not likely need this long-term.    Thank you for this consultation.  Please contact [facility] for any additional requests.  "

## 2022-05-14 NOTE — PLAN OF CARE
Goal Outcome Evaluation:  Plan of Care Reviewed With: patient        Progress: improving     Patient alert and oriented this shift. No agitation, irritation or hallucinations noted. Patient has been calm and cooperative with care provided by staff. Family remains at bedside. No distress noted. Fall precautions maintained. Call light in reach. Stand by assist with ambulation and transfers. Bed alarm on. Appetite good.

## 2022-05-14 NOTE — ED NOTES
Pt calm and cooperative, interacting with family and following commands. Pt's family brought in food for pt, and she is tolerating meal well. Will check her POC glucose after meal.

## 2022-05-14 NOTE — H&P
Patient Name:  Mery Beltran  YOB: 1965  MRN:  [id number]  Admit Date:  5/13/2022  Patient Care Team:  Bushra Garza APRN as PCP - General (Family Medicine)      Subjective   History Present Illness     Chief Complaint   Patient presents with   • Hallucinations       Ms. Beltran is a 56 y.o. female with no known medical history that presents to [facility] complaining of hallucinations and paranoia.  Patient providing very little information at this time.  Most of this HPI provided by the patient's daughter and patient's .  Daughter states that the patient has been having hallucinations particularly hearing voices for the past couple of weeks and it has progressively worsened.  Patient does tell me that sometimes the voices are nice and sometimes they are scary.  The patient has been exhibiting paranoid behaviors which is also been progressively worsening over the past couple of weeks.  She believes people are coming to get her to take her at night.  She has believed her  wants to kill her.  She quit her job 2 weeks ago after reporting coworkers were talking badly about her and did not like her.  According to the daughter, the patient's reports of what her coworkers were saying did not make sense as the coworkers could not have known very personal details about the patient.  Patient has also been reporting to the family concerns that she is going to die at any time.  She also talks about her fears of dying in an airplane although she has not flown in an airplane recently or have any plans to fly.  The patient has been very anxious and asking the family to pray.    Patient apparently had some paranoid thoughts about 20 years ago when she first moved to the [place] from the [facility].  There is no known history of psychiatric illness.  The patient's mother had dementia.  No previous medical diagnosis, however was told about 10 years ago to take diabetic medications.   The patient has been on no daily medications excluding vitamins.  Denies substance use or alcohol use.  Family also concerned that the patient has not been eating or drinking much for the past couple of weeks.  They took her to the doctor yesterday due to concerns over her hearing voices.  Apparently at the primary care provider's office the patient was tachycardic and sent to the emergency department.    Patient currently reports difficulty passing stool for the past couple of days.        Review of Systems   Constitutional: Positive for activity change and appetite change. Negative for chills and fever.   HENT: Negative for congestion.    Eyes: Negative for visual disturbance.   Respiratory: Negative for shortness of breath.    Gastrointestinal: Positive for constipation. Negative for abdominal pain, diarrhea and nausea.   Genitourinary: Negative for difficulty urinating.   Musculoskeletal: Negative for arthralgias and myalgias.   Skin: Negative for rash and wound.   Neurological: Negative for dizziness, weakness, light-headedness, numbness and headaches.   Psychiatric/Behavioral: Positive for agitation and hallucinations.        Personal History     History reviewed. No pertinent past medical history.  History reviewed. No pertinent surgical history.  Family History   Problem Relation Age of Onset   • Heart disease Mother    • Dementia Mother    • Lung disease Father    • No Known Problems Sister    • No Known Problems Brother    • No Known Problems Daughter    • No Known Problems Son    • No Known Problems Son      Social History     Tobacco Use   • Smoking status: Never Smoker   • Smokeless tobacco: Never Used   Vaping Use   • Vaping Use: Never used   Substance Use Topics   • Alcohol use: Never   • Drug use: Never     No current facility-administered medications on file prior to encounter.     Current Outpatient Medications on File Prior to Encounter   Medication Sig Dispense Refill   • Cyanocobalamin (B-12  PO) Take  by mouth.       No Known Allergies    Objective    Objective     Vital Signs  Temp:  [97.5 °F (36.4 °C)-98.3 °F (36.8 °C)] 98.3 °F (36.8 °C)  Heart Rate:  [] 106  Resp:  [16-17] 17  BP: (109-144)/(69-96) 109/69  SpO2:  [95 %-98 %] 98 %  on   ;   Device (Oxygen Therapy): room air  Body mass index is 19.19 kg/m².    Physical Exam  Constitutional:       General: She is not in acute distress.     Appearance: She is not ill-appearing.   HENT:      Head: Normocephalic and atraumatic.      Nose: Nose normal.      Mouth/Throat:      Mouth: Mucous membranes are moist.   Eyes:      Extraocular Movements: Extraocular movements intact.      Conjunctiva/sclera: Conjunctivae normal.      Pupils: Pupils are equal, round, and reactive to light.   Cardiovascular:      Rate and Rhythm: Normal rate and regular rhythm.      Pulses: Normal pulses.      Heart sounds: Normal heart sounds.   Pulmonary:      Effort: Pulmonary effort is normal. No respiratory distress.      Breath sounds: Normal breath sounds.   Abdominal:      General: Bowel sounds are normal. There is no distension.      Tenderness: There is no abdominal tenderness.   Musculoskeletal:         General: Swelling present. Normal range of motion.      Cervical back: Normal range of motion and neck supple.      Comments: Left arm swelling, IV infiltrating.    Skin:     General: Skin is warm and dry.   Neurological:      General: No focal deficit present.      Mental Status: She is alert and oriented to person, place, and time.   Psychiatric:         Attention and Perception: She is inattentive.         Mood and Affect: Affect is flat.      Comments: Speech delayed, low volume  poor eye contact  Withdrawn         Results Review:  I reviewed the patient's new clinical results.      Lab Results (last 24 hours)     Procedure Component Value Units Date/Time    CBC & Differential [[id number]] Collected: 05/13/22 1534    Specimen: Blood Updated: 05/14/22 0712     WBC  7.7 x10E3/uL      RBC 5.23 x10E6/uL      Hemoglobin 14.8 g/dL      Hematocrit 44.2 %      MCV 85 fL      MCH 28.3 pg      MCHC 33.5 g/dL      RDW 12.3 %      Platelets 274 x10E3/uL      Neutrophil Rel % 75 %      Lymphocyte Rel % 17 %      Monocyte Rel % 7 %      Eosinophil Rel % 0 %      Basophil Rel % 1 %      Neutrophils Absolute 5.8 x10E3/uL      Lymphocytes Absolute 1.3 x10E3/uL      Monocytes Absolute 0.5 x10E3/uL      Eosinophils Absolute 0.0 x10E3/uL      Basophils Absolute 0.0 x10E3/uL      Immature Granulocyte Rel % 0 %      Immature Grans Absolute 0.0 x10E3/uL     Narrative:      Performed at:  [address]  [id number]  : Doroteo Hawley PhD, Phone:  [phone]  Patient Fasting:  N     Comprehensive Metabolic Panel [[id number]]  (Abnormal) Collected: 05/13/22 1534    Specimen: Blood Updated: 05/14/22 0712     Glucose 344 mg/dL      BUN 11 mg/dL      Creatinine 0.66 mg/dL      EGFR Result 102 mL/min/1.73      BUN/Creatinine Ratio 17     Sodium 142 mmol/L      Potassium 3.8 mmol/L      Chloride 100 mmol/L      Total CO2 21 mmol/L      Calcium 10.2 mg/dL      Total Protein 7.3 g/dL      Albumin 5.0 g/dL      Globulin 2.3 g/dL      A/G Ratio 2.2     Total Bilirubin 0.6 mg/dL      Alkaline Phosphatase 110 IU/L      AST (SGOT) 21 IU/L      ALT (SGPT) 25 IU/L     Narrative:      Performed at:  [address]  [id number]  : Doroteo Hawley PhD, Phone:  [phone]  Patient Fasting:  N     Hemoglobin A1c [[id number]]  (Abnormal) Collected: 05/13/22 1534    Specimen: Blood Updated: 05/14/22 0712     Hemoglobin A1C 10.8 %      Comment:          Prediabetes: 5.7 - 6.4           Diabetes: >6.4           Glycemic control for adults with diabetes: <7.0         Narrative:      Performed at:  [address]  [id number]  : Doroteo Hawley PhD, Phone:  [phone]  Patient Fasting:  N      Hepatitis C Antibody [[id number]] Collected: 05/13/22 1534    Specimen: Blood Updated: 05/14/22 0712     Hep C Virus Ab <0.1 s/co ratio      Comment:                                   Negative:     < 0.8                               Indeterminate: 0.8 - 0.9                                    Positive:     > 0.9   The CDC recommends that a positive HCV antibody result   be followed up with a HCV Nucleic Acid Amplification   test (855666).         Narrative:      Performed at:  [address]  [id number]  : Doroteo Hawley PhD, Phone:  [phone]  Patient Fasting:  N     Lipid Panel With / Chol / HDL Ratio [[id number]]  (Abnormal) Collected: 05/13/22 1534    Specimen: Blood Updated: 05/14/22 0712     Total Cholesterol 310 mg/dL      Triglycerides 89 mg/dL      HDL Cholesterol 56 mg/dL      VLDL Cholesterol Percy 15 mg/dL      LDL Chol Calc (NIH) 239 mg/dL      Chol/HDL Ratio 5.5 ratio      Comment:                                   T. Chol/HDL Ratio                                              Men  Women                                1/2 Avg.Risk  3.4    3.3                                    Avg.Risk  5.0    4.4                                 2X Avg.Risk  9.6    7.1                                 3X Avg.Risk 23.4   11.0         Narrative:      Performed at:  [address]  [id number]  : Doroteo Hawley PhD, Phone:  [phone]  Patient Fasting:  N     TSH+Free T4 [[id number]] Collected: 05/13/22 1534    Specimen: Blood Updated: 05/14/22 0712     TSH 3.240 uIU/mL      Free T4 1.39 ng/dL     Narrative:      Performed at:  [address]  [id number]  : Doroteo Hawley PhD, Phone:  [phone]  Patient Fasting:  N     Urinalysis With Microscopic If Indicated (No Culture) - Urine, Clean Catch [[id number]]  (Abnormal) Collected: 05/13/22 1534    Specimen: Urine, Clean Catch  Updated: 05/14/22 0712     Specific Gravity, UA      >=1.030     pH, UA 6.0     Color, UA Yellow     Appearance, UA Clear     Leukocytes, UA Negative     Protein Trace     Glucose, UA 3+     Ketones 1+     Blood, UA Negative     Bilirubin, UA Negative     Urobilinogen, UA 0.2 mg/dL      Nitrite, UA Negative     Microscopic Examination Comment     Comment: Microscopic not indicated and not performed.       Narrative:      Performed at:   - [address]  [id number]  : Doroteo Hawley PhD, Phone:  [phone]  Patient Fasting:  N     Vitamin D 25 Hydroxy [[id number]]  (Abnormal) Collected: 05/13/22 1534    Specimen: Blood Updated: 05/14/22 0712     25 Hydroxy, Vitamin D 23.4 ng/mL      Comment: Vitamin D deficiency has been defined by the [place] of  Medicine and an Endocrine Society practice guideline as a  level of serum 25-OH vitamin D less than 20 ng/mL (1,2).  The Endocrine Society went on to further define vitamin D  insufficiency as a level between 21 and 29 ng/mL (2).  1. IOM ([place] of [employer]). 2010. Dietary reference     intakes for calcium and D. [place]: The     National Academies Press.  2. oLrne MF, Bernardo NC, Darshana CHANG, et al.     Evaluation, treatment, and prevention of vitamin D     deficiency: an Endocrine Society clinical practice     guideline. JCEM. 2011 Jul; 96(7):1911-30.         Narrative:      Performed at:  [address]  [id number]  : Doroteo Hawley PhD, Phone:  [phone]  Patient Fasting:  N     Vitamin B12 [[id number]]  (Abnormal) Collected: 05/13/22 1534    Specimen: Blood Updated: 05/14/22 0712     Vitamin B-12 1,314 pg/mL     Narrative:      Performed at:  [address]  [id number]  : Doroteo Hawley PhD, Phone:  [phone]  Patient Fasting:  N     CBC & Differential [[id number]]  (Abnormal) Collected: 05/13/22 1908     Specimen: Blood Updated: 05/13/22 1920    Narrative:      The following orders were created for panel order CBC & Differential.  Procedure                               Abnormality         Status                     ---------                               -----------         ------                     CBC Auto Differential[[id number]]        Abnormal            Final result                 Please view results for these tests on the individual orders.    Comprehensive Metabolic Panel [[id number]]  (Abnormal) Collected: 05/13/22 1908    Specimen: Blood Updated: 05/13/22 1943     Glucose 359 mg/dL      BUN 12 mg/dL      Creatinine 0.66 mg/dL      Sodium 139 mmol/L      Potassium 3.7 mmol/L      Chloride 102 mmol/L      CO2 19.4 mmol/L      Calcium 10.3 mg/dL      Total Protein 7.8 g/dL      Albumin 4.80 g/dL      ALT (SGPT) 24 U/L      AST (SGOT) 20 U/L      Alkaline Phosphatase 101 U/L      Total Bilirubin 0.7 mg/dL      Globulin 3.0 gm/dL      A/G Ratio 1.6 g/dL      BUN/Creatinine Ratio 18.2     Anion Gap 17.6 mmol/L      eGFR 101.8 mL/min/1.73      Comment: National Kidney Foundation and American Society of Nephrology (ASN) Task Force recommended calculation based on the Chronic Kidney Disease Epidemiology Collaboration (CKD-EPI) equation refit without adjustment for race.       Narrative:      GFR Normal >60  Chronic Kidney Disease <60  Kidney Failure <15      Troponin [[id number]]  (Normal) Collected: 05/13/22 1908    Specimen: Blood Updated: 05/13/22 1943     Troponin T <0.010 ng/mL     Narrative:      Troponin T Reference Range:  <= 0.03 ng/mL-   Negative for AMI  >0.03 ng/mL-     Abnormal for myocardial necrosis.  Clinicians would have to utilize clinical acumen, EKG, Troponin and serial changes to determine if it is an Acute Myocardial Infarction or myocardial injury due to an underlying chronic condition.       Results may be falsely decreased if patient taking Biotin.      Ethanol [[id number]] Collected:  05/13/22 1908    Specimen: Blood Updated: 05/13/22 1943     Ethanol <10 mg/dL      Ethanol % <0.010 %     CBC Auto Differential [[id number]]  (Abnormal) Collected: 05/13/22 1908    Specimen: Blood Updated: 05/13/22 1920     WBC 9.36 10*3/mm3      RBC 5.31 10*6/mm3      Hemoglobin 15.2 g/dL      Hematocrit 44.9 %      MCV 84.6 fL      MCH 28.6 pg      MCHC 33.9 g/dL      RDW 12.2 %      RDW-SD 37.3 fl      MPV 9.3 fL      Platelets 293 10*3/mm3      Neutrophil % 71.8 %      Lymphocyte % 20.2 %      Monocyte % 7.2 %      Eosinophil % 0.0 %      Basophil % 0.4 %      Immature Grans % 0.4 %      Neutrophils, Absolute 6.72 10*3/mm3      Lymphocytes, Absolute 1.89 10*3/mm3      Monocytes, Absolute 0.67 10*3/mm3      Eosinophils, Absolute 0.00 10*3/mm3      Basophils, Absolute 0.04 10*3/mm3      Immature Grans, Absolute 0.04 10*3/mm3      nRBC 0.0 /100 WBC     Urinalysis With Microscopic If Indicated (No Culture) - Urine, Clean Catch [[id number]]  (Abnormal) Collected: 05/13/22 2023    Specimen: Urine, Clean Catch Updated: 05/13/22 2051     Color, UA Yellow     Appearance, UA Clear     pH, UA 6.0     Specific Gravity, UA >=1.030     Glucose, UA >=1000 mg/dL (3+)     Ketones, UA 80 mg/dL (3+)     Bilirubin, UA Negative     Blood, UA Negative     Protein, UA 30 mg/dL (1+)     Leuk Esterase, UA Negative     Nitrite, UA Negative     Urobilinogen, UA 1.0 E.U./dL    Urine Drug Screen - Urine, Clean Catch [[id number]]  (Normal) Collected: 05/13/22 2023    Specimen: Urine, Clean Catch Updated: 05/13/22 2107     Amphet/Methamphet, Screen Negative     Barbiturates Screen, Urine Negative     Benzodiazepine Screen, Urine Negative     Cocaine Screen, Urine Negative     Opiate Screen Negative     THC, Screen, Urine Negative     Methadone Screen, Urine Negative     Oxycodone Screen, Urine Negative    Narrative:      Negative Thresholds Per Drugs Screened:    Amphetamines                 500 ng/ml  Barbiturates                 200  ng/ml  Benzodiazepines              100 ng/ml  Cocaine                      300 ng/ml  Methadone                    300 ng/ml  Opiates                      300 ng/ml  Oxycodone                    100 ng/ml  THC                           50 ng/ml    The Normal Value for all drugs tested is negative. This report includes final unconfirmed screening results to be used for medical treatment purposes only. Unconfirmed results must not be used for non-medical purposes such as employment or legal testing. Clinical consideration should be applied to any drug of abuse test, particularly when unconfirmed results are used.            Urinalysis, Microscopic Only - Urine, Clean Catch [[id number]] Collected: 05/13/22 2023    Specimen: Urine, Clean Catch Updated: 05/13/22 2051     RBC, UA 0-2 /HPF      WBC, UA 0-2 /HPF      Bacteria, UA None Seen /HPF      Squamous Epithelial Cells, UA 0-2 /HPF      Hyaline Casts, UA 0-2 /LPF      Methodology Automated Microscopy    POC Glucose Once [[id number]]  (Abnormal) Collected: 05/13/22 2155    Specimen: Blood Updated: 05/13/22 2156     Glucose 299 mg/dL      Comment: Meter: [id number] : [id number] [facility]       COVID PRE-OP / PRE-PROCEDURE SCREENING ORDER (NO ISOLATION) - Swab, Nasopharynx [[id number]]  (Normal) Collected: 05/13/22 2250    Specimen: Swab from Nasopharynx Updated: 05/13/22 2331    Narrative:      The following orders were created for panel order COVID PRE-OP / PRE-PROCEDURE SCREENING ORDER (NO ISOLATION) - Swab, Nasopharynx.  Procedure                               Abnormality         Status                     ---------                               -----------         ------                     COVID-19,[facility]-HOUSE...[[id number]]  Normal              Final result                 Please view results for these tests on the individual orders.    COVID-19,[facility]-HOUSE CEPHEID/ALYCIA NP SWAB IN TRANSPORT MEDIA 8-12 HR TAT - Swab, Nasopharynx [[id number]]   (Normal) Collected: 05/13/22 2250    Specimen: Swab from Nasopharynx Updated: 05/13/22 2331     COVID19 Not Detected    Narrative:      Fact sheet for providers: https://www.fda.gov/media/035589/download    Fact sheet for patients: https://www.fda.gov/media/631981/download    Test performed by PCR.    Basic Metabolic Panel [[id number]]  (Abnormal) Collected: 05/14/22 0639    Specimen: Blood Updated: 05/14/22 0720     Glucose 293 mg/dL      BUN 15 mg/dL      Creatinine 0.58 mg/dL      Sodium 145 mmol/L      Potassium 3.3 mmol/L      Comment: Slight hemolysis detected by analyzer. Results may be affected.        Chloride 107 mmol/L      CO2 26.0 mmol/L      Calcium 9.2 mg/dL      BUN/Creatinine Ratio 25.9     Anion Gap 12.0 mmol/L      eGFR 105.0 mL/min/1.73      Comment: National Kidney Foundation and American Society of Nephrology (ASN) Task Force recommended calculation based on the Chronic Kidney Disease Epidemiology Collaboration (CKD-EPI) equation refit without adjustment for race.       Narrative:      GFR Normal >60  Chronic Kidney Disease <60  Kidney Failure <15      CBC (No Diff) [[id number]]  (Normal) Collected: 05/14/22 0639    Specimen: Blood Updated: 05/14/22 0708     WBC 9.59 10*3/mm3      RBC 4.67 10*6/mm3      Hemoglobin 13.1 g/dL      Hematocrit 41.0 %      MCV 87.8 fL      MCH 28.1 pg      MCHC 32.0 g/dL      RDW 12.5 %      RDW-SD 40.4 fl      MPV 9.1 fL      Platelets 196 10*3/mm3     Hemoglobin A1c [[id number]]  (Abnormal) Collected: 05/14/22 0639    Specimen: Blood Updated: 05/14/22 0712     Hemoglobin A1C 10.50 %     Narrative:      Hemoglobin A1C Ranges:    Increased Risk for Diabetes  5.7% to 6.4%  Diabetes                     >= 6.5%  Diabetic Goal                < 7.0%    POC Glucose Once [[id number]]  (Abnormal) Collected: 05/14/22 1103    Specimen: Blood Updated: 05/14/22 1105     Glucose 280 mg/dL      Comment: Meter: [id number] : [id number] Manoj CONLEY       Urine Drug Screen -  Urine, Clean Catch [[id number]]  (Normal) Collected: 05/14/22 1153    Specimen: Urine, Clean Catch Updated: 05/14/22 1227     Amphet/Methamphet, Screen Negative     Barbiturates Screen, Urine Negative     Benzodiazepine Screen, Urine Negative     Cocaine Screen, Urine Negative     Opiate Screen Negative     THC, Screen, Urine Negative     Methadone Screen, Urine Negative     Oxycodone Screen, Urine Negative    Narrative:      Negative Thresholds Per Drugs Screened:    Amphetamines                 500 ng/ml  Barbiturates                 200 ng/ml  Benzodiazepines              100 ng/ml  Cocaine                      300 ng/ml  Methadone                    300 ng/ml  Opiates                      300 ng/ml  Oxycodone                    100 ng/ml  THC                           50 ng/ml    The Normal Value for all drugs tested is negative. This report includes final unconfirmed screening results to be used for medical treatment purposes only. Unconfirmed results must not be used for non-medical purposes such as employment or legal testing. Clinical consideration should be applied to any drug of abuse test, particularly when unconfirmed results are used.                  Imaging Results (Last 24 Hours)     Procedure Component Value Units Date/Time    CT Head Without Contrast [[id number]] Collected: 05/13/22 2034     Updated: 05/13/22 2034    Narrative:      CT OF THE BRAIN WITHOUT CONTRAST 05/13/2022     HISTORY: Hallucinations. Delusions.      TECHNIQUE: Axial images were obtained through the brain without  intravenous contrast.     FINDINGS: The brain parenchyma and ventricular system appear within  normal limits. No mass lesions, midline shift, intracranial hemorrhage  or evidence of infarction is demonstrated.     No bony abnormalities are seen.       Impression:      1. No acute process.     Radiation dose reduction techniques were utilized, including automated  exposure control and exposure modulation based on body  size.          XR Chest 1 View [[id number]] Collected: 05/13/22 1931     Updated: 05/13/22 1935    Narrative:      PORTABLE CHEST 05/13/2022 AT 7:14 PM     CLINICAL HISTORY: Abnormal EKG     The heart, lungs and mediastinal structures appear within normal limits.  The pulmonary vasculature is normal. There are no pleural effusions.     IMPRESSIONS: Normal chest x-ray.     This report was finalized on 5/13/2022 7:31 PM by Dr. Ej Gaming M.D.                 ECG 12 Lead   Final Result   HEART RATE= 104  bpm   RR Interval= 580  ms   NC Interval= 129  ms   P Horizontal Axis= -14  deg   P Front Axis= 55  deg   QRSD Interval= 77  ms   QT Interval= 337  ms   QRS Axis= 21  deg   T Wave Axis= -71  deg   - ABNORMAL ECG -   Sinus tachycardia   Probable left atrial enlargement   Nonspecific T abnormalities, diffuse leads   NO PRIOR TRACING AVAILABLE FOR COMPARISON   Electronically Signed By: Yang Silverman ([facility]) 13-May-2022 20:15:49   Date and Time of Study: 2022-05-13 19:49:44           Assessment/Plan     Active Hospital Problems    Diagnosis  POA   • Auditory hallucinations [R44.0]  Unknown   • Type 2 diabetes mellitus with hyperglycemia (HCC) [E11.65]  Unknown   • Altered mental status, unspecified altered mental status type [R41.82]  Yes      Resolved Hospital Problems   No resolved problems to display.       Work-up: Glucose 344, 359, 299, 293, 280  Potassium 3.3  BUN/creatinine ratio 25.9  Hemoglobin A1c 10.5  Total cholesterol 310, , cholesterol HDL ratio 5.5  Vitamin B12 1,314  Vitamin D 23.4  White count normal  Hemoglobin normal  Hepatitis C screen negative  UA positive for glucose, ketones, protein  COVID-19 negative  Urine drug screen negative  Chest x-ray negative acute  CT of head negative acute      Plan:  Newly diagnosed diabetes.  We will treat with NovoLog sliding scale.  Consult diabetes nurse educator when patient in a better state for learning.  Will need good follow-up with PCP at  discharge.  We will monitor labs, vital signs, intake and output.  Treat constipation with MiraLAX.  Pending psychiatrist evaluation.  Consult neurology for input of any further medical work up.       · I discussed the patient's findings and my recommendations with patient, family and nursing staff.    VTE Prophylaxis - SCDs.  Code Status - Full code.       MICHI Wooten  [facility]  05/14/22  12:37 EDT

## 2022-05-14 NOTE — NURSING NOTE
Access follow up  Pt admitted overnight. Pt remains distrustful of staff. Pt given ativan due to agitation.   Pts family remains at bedside.  Psychiatry to see today.

## 2022-05-14 NOTE — ED NOTES
"Nursing report ED to floor  Mery Beltran  56 y.o.  female    HPI :   Chief Complaint   Patient presents with   • Hallucinations       Admitting doctor:   Haseeb Mckeon MD    Admitting diagnosis:   The primary encounter diagnosis was Altered mental status, unspecified altered mental status type. Diagnoses of Hallucination, Uncontrolled type 2 diabetes mellitus with hyperglycemia (HCC), and Delusion (HCC) were also pertinent to this visit.    Code status:   Current Code Status     Date Active Code Status Order ID Comments User Context       Not on file    Advance Care Planning Activity          Allergies:   Patient has no known allergies.    Intake and Output    Intake/Output Summary (Last 24 hours) at 5/13/2022 2319  Last data filed at 5/13/2022 2147  Gross per 24 hour   Intake 1000 ml   Output --   Net 1000 ml       Weight:       05/13/22  1858   Weight: 43.1 kg (95 lb)       Most recent vitals:   Vitals:    05/13/22 1608 05/13/22 1858 05/13/22 2201 05/13/22 2301   BP:   124/74 139/96   Pulse:       Resp:       Temp: 97.5 °F (36.4 °C)      TempSrc: Tympanic      SpO2:       Weight:  43.1 kg (95 lb)     Height:  149.9 cm (59\")         Active LDAs/IV Access:   Lines, Drains & Airways     Active LDAs     Name Placement date Placement time Site Days    Peripheral IV 05/13/22 1908 Left Antecubital 05/13/22 1908  Antecubital  less than 1                Labs (abnormal labs have a star):   Labs Reviewed   COMPREHENSIVE METABOLIC PANEL - Abnormal; Notable for the following components:       Result Value    Glucose 359 (*)     CO2 19.4 (*)     Anion Gap 17.6 (*)     All other components within normal limits    Narrative:     GFR Normal >60  Chronic Kidney Disease <60  Kidney Failure <15     URINALYSIS W/ MICROSCOPIC IF INDICATED (NO CULTURE) - Abnormal; Notable for the following components:    Glucose, UA >=1000 mg/dL (3+) (*)     Ketones, UA 80 mg/dL (3+) (*)     Protein, UA 30 mg/dL (1+) (*)     All other components within " normal limits   CBC WITH AUTO DIFFERENTIAL - Abnormal; Notable for the following components:    RBC 5.31 (*)     RDW 12.2 (*)     Eosinophil % 0.0 (*)     All other components within normal limits   POCT GLUCOSE FINGERSTICK - Abnormal; Notable for the following components:    Glucose 299 (*)     All other components within normal limits   TROPONIN (IN-HOUSE) - Normal    Narrative:     Troponin T Reference Range:  <= 0.03 ng/mL-   Negative for AMI  >0.03 ng/mL-     Abnormal for myocardial necrosis.  Clinicians would have to utilize clinical acumen, EKG, Troponin and serial changes to determine if it is an Acute Myocardial Infarction or myocardial injury due to an underlying chronic condition.       Results may be falsely decreased if patient taking Biotin.     URINE DRUG SCREEN - Normal    Narrative:     Negative Thresholds Per Drugs Screened:    Amphetamines                 500 ng/ml  Barbiturates                 200 ng/ml  Benzodiazepines              100 ng/ml  Cocaine                      300 ng/ml  Methadone                    300 ng/ml  Opiates                      300 ng/ml  Oxycodone                    100 ng/ml  THC                           50 ng/ml    The Normal Value for all drugs tested is negative. This report includes final unconfirmed screening results to be used for medical treatment purposes only. Unconfirmed results must not be used for non-medical purposes such as employment or legal testing. Clinical consideration should be applied to any drug of abuse test, particularly when unconfirmed results are used.           COVID PRE-OP / PRE-PROCEDURE SCREENING ORDER (NO ISOLATION)    Narrative:     The following orders were created for panel order COVID PRE-OP / PRE-PROCEDURE SCREENING ORDER (NO ISOLATION) - Swab, Nasopharynx.  Procedure                               Abnormality         Status                     ---------                               -----------         ------                      COVID-19, KEITH IN-HOUSE...[[id number]]                      In process                   Please view results for these tests on the individual orders.   COVID-19, KEITH IN-HOUSE CEPHEID/ALYCIA, NP SWAB IN TRANSPORT MEDIA 8-12 HR TAT   ETHANOL   URINALYSIS, MICROSCOPIC ONLY   CBC AND DIFFERENTIAL    Narrative:     The following orders were created for panel order CBC & Differential.  Procedure                               Abnormality         Status                     ---------                               -----------         ------                     CBC Auto Differential[[id number]]        Abnormal            Final result                 Please view results for these tests on the individual orders.       EKG:   ECG 12 Lead   Final Result   HEART RATE= 104  bpm   RR Interval= 580  ms   TX Interval= 129  ms   P Horizontal Axis= -14  deg   P Front Axis= 55  deg   QRSD Interval= 77  ms   QT Interval= 337  ms   QRS Axis= 21  deg   T Wave Axis= -71  deg   - ABNORMAL ECG -   Sinus tachycardia   Probable left atrial enlargement   Nonspecific T abnormalities, diffuse leads   NO PRIOR TRACING AVAILABLE FOR COMPARISON   Electronically Signed By: Yang Silverman ([facility]) 13-May-2022 20:15:49   Date and Time of Study: 2022-05-13 19:49:44          Meds given in ED:   Medications   LORazepam (ATIVAN) injection 0.5 mg (0.5 mg Intravenous Given 5/13/22 1911)   sodium chloride 0.9 % bolus 1,000 mL (0 mL Intravenous Stopped 5/13/22 2147)   insulin regular (humuLIN R,novoLIN R) injection 5 Units (5 Units Subcutaneous Given 5/13/22 2028)       Imaging results:  CT Head Without Contrast    Result Date: 5/13/2022  1. No acute process.  Radiation dose reduction techniques were utilized, including automated exposure control and exposure modulation based on body size.         Ambulatory status:   - up ad berenice    Social issues:   Social History     Socioeconomic History   • Marital status:    Tobacco Use   • Smoking status: Never  Smoker   • Smokeless tobacco: Never Used   Substance and Sexual Activity   • Alcohol use: Never   • Drug use: Never       NIH Stroke Scale:        Nursing report ED to floor:

## 2022-05-15 ENCOUNTER — APPOINTMENT (OUTPATIENT)
Dept: MRI IMAGING | Facility: HOSPITAL | Age: 57
End: 2022-05-15

## 2022-05-15 PROBLEM — F29 PSYCHOSIS: Status: ACTIVE | Noted: 2022-05-15

## 2022-05-15 PROBLEM — E87.6 HYPOKALEMIA: Status: ACTIVE | Noted: 2022-05-15

## 2022-05-15 LAB
ANION GAP SERPL CALCULATED.3IONS-SCNC: 12 MMOL/L (ref 5–15)
BASOPHILS # BLD AUTO: 0.04 10*3/MM3 (ref 0–0.2)
BASOPHILS NFR BLD AUTO: 0.6 % (ref 0–1.5)
BUN SERPL-MCNC: 9 MG/DL (ref 6–20)
BUN/CREAT SERPL: 17.3 (ref 7–25)
CALCIUM SPEC-SCNC: 8.8 MG/DL (ref 8.6–10.5)
CHLORIDE SERPL-SCNC: 110 MMOL/L (ref 98–107)
CO2 SERPL-SCNC: 21 MMOL/L (ref 22–29)
CREAT SERPL-MCNC: 0.52 MG/DL (ref 0.57–1)
DEPRECATED RDW RBC AUTO: 38.4 FL (ref 37–54)
EGFRCR SERPLBLD CKD-EPI 2021: 107.8 ML/MIN/1.73
EOSINOPHIL # BLD AUTO: 0.11 10*3/MM3 (ref 0–0.4)
EOSINOPHIL NFR BLD AUTO: 1.7 % (ref 0.3–6.2)
ERYTHROCYTE [DISTWIDTH] IN BLOOD BY AUTOMATED COUNT: 12.2 % (ref 12.3–15.4)
GLUCOSE BLDC GLUCOMTR-MCNC: 149 MG/DL (ref 70–130)
GLUCOSE BLDC GLUCOMTR-MCNC: 190 MG/DL (ref 70–130)
GLUCOSE BLDC GLUCOMTR-MCNC: 233 MG/DL (ref 70–130)
GLUCOSE BLDC GLUCOMTR-MCNC: 96 MG/DL (ref 70–130)
GLUCOSE SERPL-MCNC: 113 MG/DL (ref 65–99)
HCT VFR BLD AUTO: 40.3 % (ref 34–46.6)
HGB BLD-MCNC: 13 G/DL (ref 12–15.9)
IMM GRANULOCYTES # BLD AUTO: 0.02 10*3/MM3 (ref 0–0.05)
IMM GRANULOCYTES NFR BLD AUTO: 0.3 % (ref 0–0.5)
LYMPHOCYTES # BLD AUTO: 2.14 10*3/MM3 (ref 0.7–3.1)
LYMPHOCYTES NFR BLD AUTO: 33.9 % (ref 19.6–45.3)
MCH RBC QN AUTO: 27.6 PG (ref 26.6–33)
MCHC RBC AUTO-ENTMCNC: 32.3 G/DL (ref 31.5–35.7)
MCV RBC AUTO: 85.6 FL (ref 79–97)
MONOCYTES # BLD AUTO: 0.54 10*3/MM3 (ref 0.1–0.9)
MONOCYTES NFR BLD AUTO: 8.5 % (ref 5–12)
NEUTROPHILS NFR BLD AUTO: 3.47 10*3/MM3 (ref 1.7–7)
NEUTROPHILS NFR BLD AUTO: 55 % (ref 42.7–76)
NRBC BLD AUTO-RTO: 0 /100 WBC (ref 0–0.2)
PLATELET # BLD AUTO: 185 10*3/MM3 (ref 140–450)
PMV BLD AUTO: 9.2 FL (ref 6–12)
POTASSIUM SERPL-SCNC: 3.3 MMOL/L (ref 3.5–5.2)
POTASSIUM SERPL-SCNC: 4.1 MMOL/L (ref 3.5–5.2)
RBC # BLD AUTO: 4.71 10*6/MM3 (ref 3.77–5.28)
SODIUM SERPL-SCNC: 143 MMOL/L (ref 136–145)
WBC NRBC COR # BLD: 6.32 10*3/MM3 (ref 3.4–10.8)

## 2022-05-15 PROCEDURE — 0 GADOBENATE DIMEGLUMINE 529 MG/ML SOLUTION: Performed by: INTERNAL MEDICINE

## 2022-05-15 PROCEDURE — 63710000001 INSULIN LISPRO (HUMAN) PER 5 UNITS: Performed by: NURSE PRACTITIONER

## 2022-05-15 PROCEDURE — 85025 COMPLETE CBC W/AUTO DIFF WBC: CPT | Performed by: INTERNAL MEDICINE

## 2022-05-15 PROCEDURE — G0378 HOSPITAL OBSERVATION PER HR: HCPCS

## 2022-05-15 PROCEDURE — 84132 ASSAY OF SERUM POTASSIUM: CPT | Performed by: INTERNAL MEDICINE

## 2022-05-15 PROCEDURE — A9577 INJ MULTIHANCE: HCPCS | Performed by: INTERNAL MEDICINE

## 2022-05-15 PROCEDURE — 82962 GLUCOSE BLOOD TEST: CPT

## 2022-05-15 PROCEDURE — 96376 TX/PRO/DX INJ SAME DRUG ADON: CPT

## 2022-05-15 PROCEDURE — 25010000002 LORAZEPAM PER 2 MG: Performed by: NURSE PRACTITIONER

## 2022-05-15 PROCEDURE — 70553 MRI BRAIN STEM W/O & W/DYE: CPT

## 2022-05-15 PROCEDURE — 80048 BASIC METABOLIC PNL TOTAL CA: CPT | Performed by: INTERNAL MEDICINE

## 2022-05-15 PROCEDURE — 96361 HYDRATE IV INFUSION ADD-ON: CPT

## 2022-05-15 RX ADMIN — DOCUSATE SODIUM 200 MG: 100 CAPSULE, LIQUID FILLED ORAL at 09:53

## 2022-05-15 RX ADMIN — Medication 10 ML: at 22:54

## 2022-05-15 RX ADMIN — INSULIN LISPRO 2 UNITS: 100 INJECTION, SOLUTION INTRAVENOUS; SUBCUTANEOUS at 22:53

## 2022-05-15 RX ADMIN — POTASSIUM CHLORIDE 40 MEQ: 10 TABLET, EXTENDED RELEASE ORAL at 09:59

## 2022-05-15 RX ADMIN — INSULIN LISPRO 4 UNITS: 100 INJECTION, SOLUTION INTRAVENOUS; SUBCUTANEOUS at 11:48

## 2022-05-15 RX ADMIN — SODIUM CHLORIDE 100 ML/HR: 9 INJECTION, SOLUTION INTRAVENOUS at 05:32

## 2022-05-15 RX ADMIN — SODIUM CHLORIDE 100 ML/HR: 9 INJECTION, SOLUTION INTRAVENOUS at 22:53

## 2022-05-15 RX ADMIN — Medication 10 ML: at 09:54

## 2022-05-15 RX ADMIN — DOCUSATE SODIUM 200 MG: 100 CAPSULE, LIQUID FILLED ORAL at 22:53

## 2022-05-15 RX ADMIN — LORAZEPAM 1 MG: 2 INJECTION INTRAMUSCULAR; INTRAVENOUS at 09:53

## 2022-05-15 RX ADMIN — POLYETHYLENE GLYCOL 3350 17 G: 17 POWDER, FOR SOLUTION ORAL at 09:53

## 2022-05-15 RX ADMIN — POTASSIUM CHLORIDE 40 MEQ: 10 TABLET, EXTENDED RELEASE ORAL at 17:10

## 2022-05-15 RX ADMIN — OLANZAPINE 5 MG: 5 TABLET ORAL at 22:53

## 2022-05-15 RX ADMIN — GADOBENATE DIMEGLUMINE 8 ML: 529 INJECTION, SOLUTION INTRAVENOUS at 09:07

## 2022-05-15 NOTE — PLAN OF CARE
Goal Outcome Evaluation:  Plan of Care Reviewed With: patient, spouse        Progress: improving   VSS on RA. AOX4. Anxious, paranoid, and tearful at the beginning of the shift. Patient improved after scheduled Zyprexa and PRN Ativan given. Family remains at bedside. IV fluids infusing per order. MRI screening form completed for MRI brain today.

## 2022-05-15 NOTE — PROGRESS NOTES
"Patient is seen, evaluated, and chart reviewed. Discussed with staff.      Staff reports that patient has been cooperative and compliant with medications.  No behavioral issues.    On examination, patient is found lying in bed, watching TV.  Daughter is at bedside.  Patient slept well last night.  Mood is \"okay.\"  Affect is blunted.  No SI/HI/VH.  She has had some auditory hallucinations today but they have been decreased in frequency and intensity.  Thought processes are slowed.  Judgment and insight are poor.    No medication side effects.  She has tolerated initiation of Zyprexa.  Last Accu-Chek was 233.  Overall, her blood sugars have been trending down.  I expect her mentation to return to baseline as this improves.  Continue to follow.  "

## 2022-05-15 NOTE — PLAN OF CARE
Goal Outcome Evaluation:  Plan of Care Reviewed With: patient        Progress: no change     Patient alert and oriented. Mild anxiety noted at the beginning of the shift. Ativan administered as per order. Effectiveness noted. Patient has been calm for the remainder of the shift. Family has been at bedside. She denies auditory and visual hallucinations. MRI completed this shift. Standby assist with ambulation. Potassium level treated today. Patient tolerated well. Call light in reach. She is able to make her needs known.

## 2022-05-15 NOTE — PROGRESS NOTES
Name: Mery Beltran ADMIT: 2022   : 1965  PCP: Bushra Garza APRN    MRN: [id number] LOS: 0 days   AGE/SEX: 56 y.o. female  ROOM: [facility], [place]     Subjective   Subjective   Continues with auditory hallucinations.  No visual hallucinations.  No delusions.  Positive sad mood and lack of interest.  No suicidal thoughts.  No headache.  No dizziness.  No loss of consciousness.  No seizures.  No focal neurological symptoms.  No fever or chills.    Review of Systems  Cardiovascular.  No chest pain/no shortness of breath/no palpitations/no cough  GI.  Poor appetite.  No abdominal pain or nausea or vomiting.  .  No dysuria or hematuria.     Objective   Objective   Vital Signs  Temp:  [98.1 °F (36.7 °C)-98.4 °F (36.9 °C)] 98.4 °F (36.9 °C)  Heart Rate:  [] 103  Resp:  [17-18] 18  BP: (124-152)/(71-94) 124/84  SpO2:  [95 %-96 %] 96 %  on   ;   Device (Oxygen Therapy): room air    Intake/Output Summary (Last 24 hours) at 5/15/2022 1517  Last data filed at 5/15/2022 0800  Gross per 24 hour   Intake 1955 ml   Output --   Net 1955 ml     Body mass index is 19.19 kg/m².      22  1858 22  0250   Weight: 43.1 kg (95 lb) 43.1 kg (95 lb)     Physical Exam  General.  Middle-aged female.  She is alert and oriented x3.  Currently having no active hallucinations or delusions.  No acute pain or diaphoresis.  The mood is depressed and affect is flat.   Eyes.  Pupils equal round and reactive.  Intact extraocular musculature.  No pallor or jaundice.  Normal projectory and lids.   Oral cavity.  Moist mucous membrane.  No lesions.   Neck.  Supple.  No JVD.  No lymphadenopathy or thyromegaly.   Cardiovascular.  Regular rate and rhythm with no gallops or murmurs.   Chest.  Clear to auscultation bilaterally with no added sounds.   Abdomen.  Soft lax no tenderness no organomegaly no guarding or rebound   Extremities.  No clubbing cyanosis or edema   CNS.  No acute focal neurological deficits.       Results Review:       Results from last 7 days   Lab Units 05/15/22  0714 05/14/22  0639 05/13/22  1908 05/13/22  1534   SODIUM mmol/L 143 145 139 142   POTASSIUM mmol/L 3.3* 3.3* 3.7 3.8   CHLORIDE mmol/L 110* 107 102 100   TOTAL CO2 mmol/L  --   --   --  21   CO2 mmol/L 21.0* 26.0 19.4*  --    BUN mg/dL 9 15 12 11   CREATININE mg/dL 0.52* 0.58 0.66 0.66   GLUCOSE mg/dL 113* 293* 359* 344*   CALCIUM mg/dL 8.8 9.2 10.3 10.2   AST (SGOT) U/L  --   --  20 21   ALT (SGPT) U/L  --   --  24 25     Estimated Creatinine Clearance: 80.2 mL/min (A) (by C-G formula based on SCr of 0.52 mg/dL (L)).  Results from last 7 days   Lab Units 05/14/22  0639 05/13/22  1534   HEMOGLOBIN A1C % 10.50* 10.8*     Results from last 7 days   Lab Units 05/15/22  1107 05/15/22  0638 05/14/22  2119 05/14/22  1643 05/14/22  1103 05/13/22  2155   GLUCOSE mg/dL 233* 96 180* 148* 280* 299*     Results from last 7 days   Lab Units 05/13/22  1908   TROPONIN T ng/mL <0.010         Results from last 7 days   Lab Units 05/13/22  1534   TSH uIU/mL 3.240     Results from last 7 days   Lab Units 05/14/22  1558   MAGNESIUM mg/dL 2.1     Results from last 7 days   Lab Units 05/13/22  1534   TRIGLYCERIDES mg/dL 89   HDL CHOL mg/dL 56     Results from last 7 days   Lab Units 05/15/22  0714 05/14/22  0639 05/13/22  1908 05/13/22  1908 05/13/22  1534   WBC 10*3/mm3 6.32 9.59  --  9.36 7.7   HEMOGLOBIN g/dL 13.0 13.1  --  15.2 14.8   HEMATOCRIT % 40.3 41.0  --  44.9 44.2   PLATELETS 10*3/mm3 185 196  --  293 274   MCV fL 85.6 87.8   < > 84.6 85   MCH pg 27.6 28.1   < > 28.6 28.3   MCHC g/dL 32.3 32.0   < > 33.9 33.5   RDW % 12.2* 12.5   < > 12.2* 12.3   RDW-SD fl 38.4 40.4   < > 37.3  --    MPV fL 9.2 9.1   < > 9.3  --    NEUTROPHIL % % 55.0  --    < > 71.8 75   LYMPHOCYTE % % 33.9  --    < > 20.2 17   MONOCYTES % % 8.5  --    < > 7.2 7   EOSINOPHIL % % 1.7  --    < > 0.0* 0   BASOPHIL % % 0.6  --    < > 0.4 1   IMM GRAN % % 0.3  --    < > 0.4  --    NEUTROS ABS 10*3/mm3 3.47  --     < > 6.72 5.8   LYMPHS ABS 10*3/mm3 2.14  --    < > 1.89 1.3   MONOS ABS 10*3/mm3 0.54  --    < > 0.67 0.5   EOS ABS 10*3/mm3 0.11  --    < > 0.00 0.0   BASOS ABS 10*3/mm3 0.04  --    < > 0.04 0.0   IMMATURE GRANS (ABS) 10*3/mm3 0.02  --    < > 0.04  --    NRBC /100 WBC 0.0  --    < > 0.0  --     < > = values in this interval not displayed.                     Results from last 7 days   Lab Units 05/14/22  1558   AMMONIA umol/L 30             Results from last 7 days   Lab Units 05/13/22 2023   NITRITE UA  Negative   WBC UA /HPF 0-2   BACTERIA UA /HPF None Seen   SQUAM EPITHEL UA /HPF 0-2           Imaging:  Imaging Results (Last 24 Hours)     Procedure Component Value Units Date/Time    MRI Brain With & Without Contrast [[id number]] Collected: 05/15/22 1035     Updated: 05/15/22 1044    Narrative:      MRI BRAIN W WO CONTRAST-     CLINICAL HISTORY: Paranoia. Confusion. Auditory hallucinations.     TECHNIQUE: Multiple axial T1, T2, gradient echo and diffusion images  were obtained. Axial and coronal T1-weighted images were also acquired  after intravenous injection of MultiHance contrast.      COMPARISON: CT head dated 05/13/2022.     FINDINGS: The brain and ventricular system appear structurally normal.  There is no evidence of recent or old intracranial hemorrhage or  infarction. No foci of restricted diffusion are identified within the  brain or brainstem. There are no foci of abnormal enhancement within the  brain or brainstem. No discrete masses are identified. Normal flow voids  are observed in the major vascular structures. The maxillary sinuses are  usually small in overall size, but are otherwise unremarkable. There  appear well aerated.     IMPRESSIONS: Normal MRI of the brain.     This report was finalized on 5/15/2022 10:40 AM by Dr. Ej Gaming M.D.       CT Head Without Contrast [[id number]] Collected: 05/13/22 2034     Updated: 05/14/22 1556    Narrative:      CT OF THE BRAIN WITHOUT CONTRAST  05/13/2022     HISTORY: Hallucinations. Delusions.      TECHNIQUE: Axial images were obtained through the brain without  intravenous contrast.     FINDINGS: The brain parenchyma and ventricular system appear within  normal limits. No mass lesions, midline shift, intracranial hemorrhage  or evidence of infarction is demonstrated.     No bony abnormalities are seen.       Impression:      1. No acute process.     Radiation dose reduction techniques were utilized, including automated  exposure control and exposure modulation based on body size.     This report was finalized on 5/14/2022 3:53 PM by Dr. Micheal Alonzo M.D.                I reviewed the patient's new clinical results / labs / tests / procedures      Assessment/Plan     Active Hospital Problems    Diagnosis  POA   • **Auditory hallucinations [R44.0]  Yes   • Hypokalemia [E87.6]  Yes   • Psychosis (HCC) [F29]  Yes   • Type 2 diabetes mellitus with hyperglycemia (HCC) [E11.65]  Yes   • Altered mental status, unspecified altered mental status type [R41.82]  Yes      Resolved Hospital Problems   No resolved problems to display.       1.  Hallucinations/delusions suggestive of major depression with psychosis.    There is no evidence of infection.    CNS examination is negative.  CT scan of the brain is negative.  MRI of the brain is negative.  Status post neurology consult.  Normal B12/TSH/ammonia/RPR/folic acid.  Psych evaluation is highly appreciated.  Zyprexa started.  2.  Newly diagnosed type 2 diabetes.   A1c is 10.5.  Improving on sliding scale insulin.  Will ask diabetic educator to see.  I believe the patient can be switched to triple therapy oral medication tomorrow and follow-up as an outpatient.    3.  Hypokalemia.    Normal magnesium.  Continue substitution.  4.  VTE prophylaxis   with sequential compression devices.       Discussed my findings and plan of treatment with the patient/daughter/.  Also discussed with neurology  yesterday.  Disposition.  Hopefully home tomorrow with family          Samer SIMI Flores MD  [facility]  05/15/22  15:17 EDT

## 2022-05-16 ENCOUNTER — READMISSION MANAGEMENT (OUTPATIENT)
Dept: CALL CENTER | Facility: HOSPITAL | Age: 57
End: 2022-05-16

## 2022-05-16 VITALS
DIASTOLIC BLOOD PRESSURE: 81 MMHG | WEIGHT: 95 LBS | TEMPERATURE: 97.6 F | OXYGEN SATURATION: 97 % | BODY MASS INDEX: 19.15 KG/M2 | HEART RATE: 88 BPM | RESPIRATION RATE: 16 BRPM | SYSTOLIC BLOOD PRESSURE: 134 MMHG | HEIGHT: 59 IN

## 2022-05-16 LAB
ANION GAP SERPL CALCULATED.3IONS-SCNC: 16.8 MMOL/L (ref 5–15)
BASOPHILS # BLD AUTO: 0.03 10*3/MM3 (ref 0–0.2)
BASOPHILS NFR BLD AUTO: 0.5 % (ref 0–1.5)
BUN SERPL-MCNC: 7 MG/DL (ref 6–20)
BUN/CREAT SERPL: 11.5 (ref 7–25)
CALCIUM SPEC-SCNC: 9.5 MG/DL (ref 8.6–10.5)
CHLORIDE SERPL-SCNC: 103 MMOL/L (ref 98–107)
CO2 SERPL-SCNC: 22.2 MMOL/L (ref 22–29)
CREAT SERPL-MCNC: 0.61 MG/DL (ref 0.57–1)
DEPRECATED RDW RBC AUTO: 36.4 FL (ref 37–54)
EGFRCR SERPLBLD CKD-EPI 2021: 103.8 ML/MIN/1.73
EOSINOPHIL # BLD AUTO: 0.12 10*3/MM3 (ref 0–0.4)
EOSINOPHIL NFR BLD AUTO: 1.8 % (ref 0.3–6.2)
ERYTHROCYTE [DISTWIDTH] IN BLOOD BY AUTOMATED COUNT: 12.2 % (ref 12.3–15.4)
GLUCOSE BLDC GLUCOMTR-MCNC: 106 MG/DL (ref 70–130)
GLUCOSE BLDC GLUCOMTR-MCNC: 183 MG/DL (ref 70–130)
GLUCOSE SERPL-MCNC: 162 MG/DL (ref 65–99)
HCT VFR BLD AUTO: 41.5 % (ref 34–46.6)
HGB BLD-MCNC: 13.6 G/DL (ref 12–15.9)
IMM GRANULOCYTES # BLD AUTO: 0.02 10*3/MM3 (ref 0–0.05)
IMM GRANULOCYTES NFR BLD AUTO: 0.3 % (ref 0–0.5)
LYMPHOCYTES # BLD AUTO: 2.28 10*3/MM3 (ref 0.7–3.1)
LYMPHOCYTES NFR BLD AUTO: 34.9 % (ref 19.6–45.3)
MCH RBC QN AUTO: 27.7 PG (ref 26.6–33)
MCHC RBC AUTO-ENTMCNC: 32.8 G/DL (ref 31.5–35.7)
MCV RBC AUTO: 84.5 FL (ref 79–97)
MONOCYTES # BLD AUTO: 0.51 10*3/MM3 (ref 0.1–0.9)
MONOCYTES NFR BLD AUTO: 7.8 % (ref 5–12)
NEUTROPHILS NFR BLD AUTO: 3.58 10*3/MM3 (ref 1.7–7)
NEUTROPHILS NFR BLD AUTO: 54.7 % (ref 42.7–76)
NRBC BLD AUTO-RTO: 0 /100 WBC (ref 0–0.2)
PLATELET # BLD AUTO: 191 10*3/MM3 (ref 140–450)
PMV BLD AUTO: 9.2 FL (ref 6–12)
POTASSIUM SERPL-SCNC: 3.7 MMOL/L (ref 3.5–5.2)
RBC # BLD AUTO: 4.91 10*6/MM3 (ref 3.77–5.28)
SODIUM SERPL-SCNC: 142 MMOL/L (ref 136–145)
WBC NRBC COR # BLD: 6.54 10*3/MM3 (ref 3.4–10.8)

## 2022-05-16 PROCEDURE — 96361 HYDRATE IV INFUSION ADD-ON: CPT

## 2022-05-16 PROCEDURE — 85025 COMPLETE CBC W/AUTO DIFF WBC: CPT | Performed by: INTERNAL MEDICINE

## 2022-05-16 PROCEDURE — 80048 BASIC METABOLIC PNL TOTAL CA: CPT | Performed by: INTERNAL MEDICINE

## 2022-05-16 PROCEDURE — G0378 HOSPITAL OBSERVATION PER HR: HCPCS

## 2022-05-16 PROCEDURE — 82962 GLUCOSE BLOOD TEST: CPT

## 2022-05-16 PROCEDURE — 63710000001 INSULIN LISPRO (HUMAN) PER 5 UNITS: Performed by: NURSE PRACTITIONER

## 2022-05-16 RX ORDER — LANCETS 30 GAUGE
EACH MISCELLANEOUS
Qty: 100 EACH | Refills: 0 | Status: SHIPPED | OUTPATIENT
Start: 2022-05-16

## 2022-05-16 RX ORDER — OLANZAPINE 5 MG/1
5 TABLET ORAL NIGHTLY
Qty: 30 TABLET | Refills: 0 | Status: SHIPPED | OUTPATIENT
Start: 2022-05-16 | End: 2022-05-24 | Stop reason: HOSPADM

## 2022-05-16 RX ORDER — GLIPIZIDE 2.5 MG/1
2.5 TABLET, EXTENDED RELEASE ORAL DAILY
Qty: 30 TABLET | Refills: 0 | Status: SHIPPED | OUTPATIENT
Start: 2022-05-16

## 2022-05-16 RX ADMIN — SODIUM CHLORIDE 100 ML/HR: 9 INJECTION, SOLUTION INTRAVENOUS at 08:46

## 2022-05-16 RX ADMIN — DOCUSATE SODIUM 200 MG: 100 CAPSULE, LIQUID FILLED ORAL at 08:33

## 2022-05-16 RX ADMIN — Medication 10 ML: at 08:48

## 2022-05-16 RX ADMIN — INSULIN LISPRO 2 UNITS: 100 INJECTION, SOLUTION INTRAVENOUS; SUBCUTANEOUS at 11:58

## 2022-05-16 RX ADMIN — METOPROLOL TARTRATE 12.5 MG: 25 TABLET, FILM COATED ORAL at 11:59

## 2022-05-16 RX ADMIN — POLYETHYLENE GLYCOL 3350 17 G: 17 POWDER, FOR SOLUTION ORAL at 08:33

## 2022-05-16 NOTE — CONSULTS
"Diabetes Education  Assessment/Teaching    Patient Name:  Mery Beltran  YOB: 1965  MRN: [id number]  Admit Date:  5/13/2022      Assessment Date:  5/16/2022  Flowsheet Row Most Recent Value   General Information     Referral From: MD pruett   Height 149.9 cm (59\")   Height Method Stated   Weight 43.1 kg (95 lb)   Weight Method Stated  [per family]   Diabetes History    What type of diabetes do you have? Type 2   Length of Diabetes Diagnosis Newly diagnosed <6 months  [Pt has a hx of pre-DM]   Education Preferences    Barriers to Learning other (comment)  [See comments below]   Assessment Topics    Healthy Eating - Assessment Needs education   Being Active - Assessment Needs education   Taking Medication - Assessment Needs education   Problem Solving - Assessment Needs education   Reducing Risk - Assessment Needs education   Monitoring - Assessment Needs education   DM Goals    Healthy Eating - Goal 0-30 days from discharge   Being Active - Goal 0-30 days from discharge   Taking Medication - Goal 0-7 days from discharge   Problem Solving - Goal 0-30 days from discharge   Reducing Risk - Goal 30-90 days from discharge   Monitoring - Goal 0-7 days from discharge   Contact Plan Follow-up medical care          Flowsheet Row Most Recent Value   DM Education Needs    Meter Needs meter   Meter Type One Touch  [Verio Reflect w/ Delica plus lancets.  Discussed other cost option glucometers.]   Frequency of Testing 4 times a day   Blood Glucose Target Range  mg/dL premeal   Medication Oral, Actions, Side effects  [Metformin and glucotrol]   Problem Solving Hypoglycemia, Sick days, Hyperglycemia, Signs, Symptoms, Treatment   Reducing Risks A1C testing  [a1c 10.5%]   Healthy Eating RD consult   Physical Activity Frequency Discussed exercise importance  [30 mins per day]   Healthy Coping Other (comment)  [see comments below]   Discharge Plan Home, Follow-up with PCP   Motivation Moderate   Teaching Method " Explanation, Discussion, Demonstration, Handouts, Teach back   Patient Response Verbalized understanding        Other Comments:  Patient, spouse and daughter at bedside.  Pt engaged in education but with flat affect and occasional smile.  Pt would make eye contact occasionally.      Discussed importance of outpatient follow up.  Encouraged outpatient DMSES.  Contact phone number provided.      Electronically signed by:  Russ Horowitz RN, [place]  05/16/22 14:09 EDT

## 2022-05-16 NOTE — PROGRESS NOTES
and daughter report that the patient seems to be having a better day and are hopeful for discharge later today.  Daughter reports that the patient seemed a bit paranoid this morning but is no longer reporting auditory hallucinations.  I would recommend that the patient continue on olanzapine following discharge and feel as though she is in need of psychiatric follow-up following discharge.  I will ask the access center to see her regarding arrangements for psych follow-up.

## 2022-05-16 NOTE — NURSING NOTE
Access center follow up note, regarding psychosis. Dr. Bobby also following pt for new onset of hallucinations and delusions. Pt is currently taking Zyprexa. Family is at bedside. They state that yesterday was a good day, and she seems to have calmed down. Her daughter does report that this morning pt was more paranoid. And, pt was reported as crying a lot last night.   Pt rates depression 5/10. She also reports that she is still hearing voices occasionally.   Access will continue to follow.

## 2022-05-16 NOTE — PLAN OF CARE
Problem: Adult Inpatient Plan of Care  Goal: Plan of Care Review  Outcome: Met     Problem: Adult Inpatient Plan of Care  Goal: Patient-Specific Goal (Individualized)  Outcome: Met     Problem: Adult Inpatient Plan of Care  Goal: Absence of Hospital-Acquired Illness or Injury  Outcome: Met  Intervention: Identify and Manage Fall Risk  Recent Flowsheet Documentation  Taken 5/16/2022 1000 by Tricia Engel RN  Safety Promotion/Fall Prevention:   safety round/check completed   room organization consistent  Taken 5/16/2022 0800 by Tricia Engel RN  Safety Promotion/Fall Prevention:   safety round/check completed   room organization consistent  Intervention: Prevent Skin Injury  Recent Flowsheet Documentation  Taken 5/16/2022 1000 by Tricia Engel RN  Body Position: position changed independently  Taken 5/16/2022 0800 by Tricia Engel RN  Body Position: position changed independently  Intervention: Prevent and Manage VTE (Venous Thromboembolism) Risk  Recent Flowsheet Documentation  Taken 5/16/2022 0800 by Tricia Engel RN  Activity Management: activity adjusted per tolerance   Goal Outcome Evaluation:

## 2022-05-16 NOTE — CASE MANAGEMENT/SOCIAL WORK
Case Management Discharge Note      Final Note: Pt was dc'd home         Selected Continued Care - Discharged on 5/16/2022 Admission date: 5/13/2022 - Discharge disposition: Home or Self Care    Destination    No services have been selected for the patient.              Durable Medical Equipment    No services have been selected for the patient.              Dialysis/Infusion    No services have been selected for the patient.              Home Medical Care    No services have been selected for the patient.              Therapy    No services have been selected for the patient.              Community Resources    No services have been selected for the patient.              Community & DME    No services have been selected for the patient.                  Transportation Services  Private: Car    Final Discharge Disposition Code: 01 - home or self-care

## 2022-05-16 NOTE — OUTREACH NOTE
Prep Survey    Flowsheet Row Responses   [facility] patient discharged from? [facility]   Is LACE score < 7 ? Yes   Emergency Room discharge w/ pulse ox? No   Eligibility [facility]   Date of Admission 05/13/22   Date of Discharge 05/16/22   Discharge Disposition Home or Self Care   Discharge diagnosis Auditory hallucinations    Does the patient have one of the following disease processes/diagnoses(primary or secondary)? Other   Does the patient have Home health ordered? No   Is there a DME ordered? No   Prep survey completed? Yes          JAVIER LUKE - Registered Nurse

## 2022-05-16 NOTE — DISCHARGE SUMMARY
[facility]  [facility]      Name:  Mery Beltran   Age:  56 y.o.  Sex:  female  :  1965  MRN:  [id number]   Visit Number:  [id number]  Primary Care Physician:  Bushra Garza APRN  Date of Discharge:  2022  Admission Date:  2022      Discharge Diagnosis:     1.  Hallucinations/delusions suggestive of major depression with psychosis.  2.  Newly diagnosed type 2 diabetes.   A1c is 10.5.    3.  Hypokalemia.  Replaced.    Active Hospital Problems    Diagnosis  POA   • **Auditory hallucinations [R44.0]  Yes   • Hypokalemia [E87.6]  Yes   • Psychosis (HCC) [F29]  Yes   • Type 2 diabetes mellitus with hyperglycemia (HCC) [E11.65]  Yes   • Altered mental status, unspecified altered mental status type [R41.82]  Yes      Resolved Hospital Problems   No resolved problems to display.         Presenting Problem/History of Present Illness:    Delusion (HCC) [F22]  Hallucination [R44.3]  Altered mental status, unspecified altered mental status type [R41.82]  Uncontrolled type 2 diabetes mellitus with hyperglycemia (HCC) [E11.65]         Hospital Course:    56-year-old female with no significant past medical history comes in with hallucinations and delusions.  Blood sugars noted to be elevated and hemoglobin A1c was 10.8.  She has no history of diabetes.  CT scan of the brain was negative.  Urine tox was negative.  MRI of the brain was negative.  Neurology was consulted and felt she had severe depression with acute psychosis.  They felt no further work-up was needed.    Psychiatry saw the patient.  They noted she had no past history of depression, anxiety, hipolito, psychosis.  They felt it may be related to her high blood sugar.  She was placed on insulin sliding scale, her sugars have come down.  She feels better.  She denies any suicidal ideations.    We will start the patient on metformin and low-dose Glucotrol XL at discharge.  Have diabetic teaching see her and provide her with  a glucometer.  Recommend check blood sugars before every meal and at bedtime and bring the glucometer to their appointment with her PCP which will be within the next week.  Also follow diabetic diet.    Psychiatry added Zyprexa to her regimen.  Patient is also noted to have tachycardia with mild increase blood pressures.  We will place her on low-dose Lopressor 12.5mg twice daily and see how she does with this.  She is stable to go with the care of her daughter and her .  Recommend return to the hospital if any worsening symptoms.  She will need close follow-up with her PCP.    Procedures Performed:           Consults:     Consults     Date and Time Order Name Status Description    5/14/2022 10:28 AM Inpatient Neurology Consult General Completed     5/13/2022 11:38 PM Inpatient Psychiatrist Consult Completed     5/13/2022  9:20 PM [facility] (on-call MD unless specified) Details            Pertinent Test Results:         Results from last 7 days   Lab Units 05/16/22  0733 05/15/22  2131 05/15/22  0714 05/14/22  0639 05/13/22  1908 05/13/22  1534   WBC 10*3/mm3 6.54  --  6.32 9.59 9.36 7.7   HEMOGLOBIN g/dL 13.6  --  13.0 13.1 15.2 14.8   HEMATOCRIT % 41.5  --  40.3 41.0 44.9 44.2   PLATELETS 10*3/mm3 191  --  185 196 293 274   MCV fL 84.5  --  85.6 87.8 84.6 85   SODIUM mmol/L 142  --  143 145 139 142   POTASSIUM mmol/L 3.7 4.1 3.3* 3.3* 3.7 3.8   CHLORIDE mmol/L 103  --  110* 107 102 100   TOTAL CO2 mmol/L  --   --   --   --   --  21   CO2 mmol/L 22.2  --  21.0* 26.0 19.4*  --    BUN mg/dL 7  --  9 15 12 11   CREATININE mg/dL 0.61  --  0.52* 0.58 0.66 0.66   GLUCOSE mg/dL 162*  --  113* 293* 359* 344*   CALCIUM mg/dL 9.5  --  8.8 9.2 10.3 10.2        Results from last 7 days   Lab Units 05/13/22 1908   TROPONIN T ng/mL <0.010     Results from last 7 days   Lab Units 05/16/22  0733 05/15/22  0714 05/14/22  0639 05/13/22  1908 05/13/22  1534   WBC 10*3/mm3 6.54 6.32 9.59 9.36 7.7     Results from last 7 days   Lab  "Units 05/13/22  1908 05/13/22  1534   BILIRUBIN mg/dL 0.7 0.6   ALK PHOS U/L 101 110   ALT (SGPT) U/L 24 25   AST (SGOT) U/L 20 21     Results from last 7 days   Lab Units 05/13/22  1534   TRIGLYCERIDES mg/dL 89   HDL CHOL mg/dL 56     Results from last 7 days   Lab Units 05/14/22  0639 05/13/22  1534   TSH uIU/mL  --  3.240   HEMOGLOBIN A1C % 10.50* 10.8*     Brief Urine Lab Results  (Last result in the past 365 days)      Color   Clarity   Blood   Leuk Est   Nitrite   Protein   CREAT   Urine HCG        05/13/22 2023 Yellow   Clear   Negative   Negative   Negative   30 mg/dL (1+)                             Condition on Discharge:      Stable    Vital Signs:    /81 (BP Location: Left arm, Patient Position: Lying)   Pulse 88   Temp 97.6 °F (36.4 °C) (Oral)   Resp 16   Ht 149.9 cm (59\")   Wt 43.1 kg (95 lb)   SpO2 97%   BMI 19.19 kg/m²     Physical Exam:    General: Alert and oriented x4, flat affect, mild distress.  Heart: Regular rate and rhythm without murmur rub or thrill.  Lungs: Clear to auscultation bilaterally without use of accessory muscles of respiration.  Abdomen: Soft/nontender/nondistended.  No HSM noted.  MSK: 5/5 strength in upper/lower extremities bilaterally.    Discharge Disposition:    Home or Self Care    Discharge Medication:       Discharge Medications      New Medications      Instructions Start Date   glipizide 2.5 MG 24 hr tablet  Commonly known as: Glucotrol XL   2.5 mg, Oral, Daily      metFORMIN 500 MG tablet  Commonly known as: Glucophage   500 mg, Oral, 2 Times Daily With Meals      metoprolol tartrate 25 MG tablet  Commonly known as: LOPRESSOR   12.5 mg, Oral, Every 12 Hours Scheduled      OLANZapine 5 MG tablet  Commonly known as: zyPREXA   5 mg, Oral, Nightly         Continue These Medications      Instructions Start Date   B-12 PO   Oral             Discharge Diet:     Diet Instructions     Diet: Regular, Consistent Carbohydrate      Discharge Diet:  Regular  Consistent " Carbohydrate             Activity at Discharge:     Activity Instructions     Activity as Tolerated            Follow-up Appointments:    No future appointments.  Additional Instructions for the Follow-ups that You Need to Schedule     Discharge Follow-up with PCP   As directed       Currently Documented PCP:    Bushra Garza APRN    PCP Phone Number:    [phone]     Follow Up Details: this week               Test Results Pending at Discharge:           Damaso Ledesma DO  05/16/22  11:45 EDT

## 2022-05-17 ENCOUNTER — TRANSITIONAL CARE MANAGEMENT TELEPHONE ENCOUNTER (OUTPATIENT)
Dept: CALL CENTER | Facility: HOSPITAL | Age: 57
End: 2022-05-17

## 2022-05-17 NOTE — OUTREACH NOTE
Call Center TCM Note    Flowsheet Row Responses   [facility] patient discharged from? [place]   Does the patient have one of the following disease processes/diagnoses(primary or secondary)? Other   TCM attempt successful? Yes   Discharge diagnosis Auditory hallucinations    Person spoke with today (if not patient) and relationship  Yoav Bhakta reviewed with patient/caregiver? Yes   Is the patient having any side effects they believe may be caused by any medication additions or changes? No   Does the patient have all medications ordered at discharge? Yes   Is the patient taking all medications as directed (includes completed medication regime)? Yes   Does the patient have a primary care provider?  Yes   Does the patient have an appointment with their PCP within 7 days of discharge? No   Comments regarding PCP Pt  states he is returning to work tomorrow, and as soon as he has his upcoming work schedule, will call to sched TCM APPT with PCP Bushra BORDEN,    Has the patient kept scheduled appointments due by today? N/A   Has home health visited the patient within 72 hours of discharge? N/A   Psychosocial issues? No   Did the patient receive a copy of their discharge instructions? Yes   Nursing interventions Reviewed instructions with patient   What is the patient's perception of their health status since discharge? Improving   Is the patient/caregiver able to teach back signs and symptoms related to disease process for when to call PCP? Yes   Is the patient/caregiver able to teach back signs and symptoms related to disease process for when to call 911? Yes   Is the patient/caregiver able to teach back the hierarchy of who to call/visit for symptoms/problems? PCP, Specialist, Home health nurse, Urgent Care, ED, 911 Yes   If the patient is a current smoker, are they able to teach back resources for cessation? Not a smoker   TCM call completed? Yes   Wrap up additional comments Spoke w/pt very  nice , who seems a bit overwhelmed with pt mental status. He does state her hallucinations/delusions are a  bit more intermittent now, trying to get her meal times and diabetick mes regulated. BS after dinner was 400, this a.m. 260 after breakfast. Pt dtr is taking care o fmeds and monitoring BS. Pt  states he is returning to work tomorrow, and as soon as he has his upcoming work schedule, will call to sched TCM APPT with PCP Britt Portillo MA    5/17/2022, 13:51 EDT

## 2022-05-19 ENCOUNTER — HOSPITAL ENCOUNTER (INPATIENT)
Facility: HOSPITAL | Age: 57
LOS: 5 days | Discharge: PSYCHIATRIC OR SUBSTANCE USE FACILITY | End: 2022-05-24
Attending: PSYCHIATRY & NEUROLOGY | Admitting: PSYCHIATRY & NEUROLOGY

## 2022-05-19 ENCOUNTER — OFFICE VISIT (OUTPATIENT)
Dept: INTERNAL MEDICINE | Age: 57
End: 2022-05-19

## 2022-05-19 ENCOUNTER — HOSPITAL ENCOUNTER (EMERGENCY)
Facility: HOSPITAL | Age: 57
Discharge: ANOTHER HOSPITAL | End: 2022-05-19
Attending: EMERGENCY MEDICINE | Admitting: EMERGENCY MEDICINE

## 2022-05-19 VITALS
WEIGHT: 93 LBS | HEIGHT: 59 IN | SYSTOLIC BLOOD PRESSURE: 120 MMHG | BODY MASS INDEX: 18.75 KG/M2 | HEART RATE: 96 BPM | TEMPERATURE: 97.5 F | DIASTOLIC BLOOD PRESSURE: 70 MMHG | OXYGEN SATURATION: 98 %

## 2022-05-19 VITALS
HEART RATE: 97 BPM | SYSTOLIC BLOOD PRESSURE: 100 MMHG | RESPIRATION RATE: 18 BRPM | TEMPERATURE: 98.9 F | DIASTOLIC BLOOD PRESSURE: 77 MMHG | OXYGEN SATURATION: 97 %

## 2022-05-19 DIAGNOSIS — F23 ACUTE PSYCHOSIS: Primary | ICD-10-CM

## 2022-05-19 DIAGNOSIS — F29 PSYCHOSIS, UNSPECIFIED PSYCHOSIS TYPE: ICD-10-CM

## 2022-05-19 DIAGNOSIS — I10 PRIMARY HYPERTENSION: ICD-10-CM

## 2022-05-19 DIAGNOSIS — R45.851 SUICIDAL INTENT: Primary | ICD-10-CM

## 2022-05-19 DIAGNOSIS — R44.0 AUDITORY HALLUCINATIONS: ICD-10-CM

## 2022-05-19 DIAGNOSIS — E11.65 TYPE 2 DIABETES MELLITUS WITH HYPERGLYCEMIA, WITHOUT LONG-TERM CURRENT USE OF INSULIN: ICD-10-CM

## 2022-05-19 LAB
ALBUMIN SERPL-MCNC: 5 G/DL (ref 3.5–5.2)
ALBUMIN/GLOB SERPL: 2 G/DL
ALP SERPL-CCNC: 95 U/L (ref 39–117)
ALT SERPL W P-5'-P-CCNC: 22 U/L (ref 1–33)
AMPHET+METHAMPHET UR QL: NEGATIVE
ANION GAP SERPL CALCULATED.3IONS-SCNC: 15.6 MMOL/L (ref 5–15)
AST SERPL-CCNC: 21 U/L (ref 1–32)
BARBITURATES UR QL SCN: NEGATIVE
BASOPHILS # BLD AUTO: 0.04 10*3/MM3 (ref 0–0.2)
BASOPHILS NFR BLD AUTO: 0.5 % (ref 0–1.5)
BENZODIAZ UR QL SCN: NEGATIVE
BILIRUB SERPL-MCNC: 0.7 MG/DL (ref 0–1.2)
BUN SERPL-MCNC: 14 MG/DL (ref 6–20)
BUN/CREAT SERPL: 20 (ref 7–25)
CALCIUM SPEC-SCNC: 10 MG/DL (ref 8.6–10.5)
CANNABINOIDS SERPL QL: NEGATIVE
CHLORIDE SERPL-SCNC: 100 MMOL/L (ref 98–107)
CO2 SERPL-SCNC: 20.4 MMOL/L (ref 22–29)
COCAINE UR QL: NEGATIVE
CREAT SERPL-MCNC: 0.7 MG/DL (ref 0.57–1)
DEPRECATED RDW RBC AUTO: 38 FL (ref 37–54)
EGFRCR SERPLBLD CKD-EPI 2021: 100.4 ML/MIN/1.73
EOSINOPHIL # BLD AUTO: 0.06 10*3/MM3 (ref 0–0.4)
EOSINOPHIL NFR BLD AUTO: 0.8 % (ref 0.3–6.2)
ERYTHROCYTE [DISTWIDTH] IN BLOOD BY AUTOMATED COUNT: 12.3 % (ref 12.3–15.4)
ETHANOL BLD-MCNC: <10 MG/DL (ref 0–10)
ETHANOL UR QL: <0.01 %
GLOBULIN UR ELPH-MCNC: 2.5 GM/DL
GLUCOSE BLDC GLUCOMTR-MCNC: 183 MG/DL (ref 70–130)
GLUCOSE BLDC GLUCOMTR-MCNC: 220 MG/DL (ref 70–99)
GLUCOSE SERPL-MCNC: 281 MG/DL (ref 65–99)
HCT VFR BLD AUTO: 45.5 % (ref 34–46.6)
HGB BLD-MCNC: 14.7 G/DL (ref 12–15.9)
IMM GRANULOCYTES # BLD AUTO: 0.02 10*3/MM3 (ref 0–0.05)
IMM GRANULOCYTES NFR BLD AUTO: 0.3 % (ref 0–0.5)
LYMPHOCYTES # BLD AUTO: 1.93 10*3/MM3 (ref 0.7–3.1)
LYMPHOCYTES NFR BLD AUTO: 25.7 % (ref 19.6–45.3)
MCH RBC QN AUTO: 27.7 PG (ref 26.6–33)
MCHC RBC AUTO-ENTMCNC: 32.3 G/DL (ref 31.5–35.7)
MCV RBC AUTO: 85.7 FL (ref 79–97)
METHADONE UR QL SCN: NEGATIVE
MONOCYTES # BLD AUTO: 0.69 10*3/MM3 (ref 0.1–0.9)
MONOCYTES NFR BLD AUTO: 9.2 % (ref 5–12)
NEUTROPHILS NFR BLD AUTO: 4.78 10*3/MM3 (ref 1.7–7)
NEUTROPHILS NFR BLD AUTO: 63.5 % (ref 42.7–76)
NRBC BLD AUTO-RTO: 0 /100 WBC (ref 0–0.2)
OPIATES UR QL: NEGATIVE
OXYCODONE UR QL SCN: NEGATIVE
PLATELET # BLD AUTO: 255 10*3/MM3 (ref 140–450)
PMV BLD AUTO: 9.2 FL (ref 6–12)
POTASSIUM SERPL-SCNC: 4 MMOL/L (ref 3.5–5.2)
PROT SERPL-MCNC: 7.5 G/DL (ref 6–8.5)
RBC # BLD AUTO: 5.31 10*6/MM3 (ref 3.77–5.28)
SARS-COV-2 RNA RESP QL NAA+PROBE: NOT DETECTED
SODIUM SERPL-SCNC: 136 MMOL/L (ref 136–145)
WBC NRBC COR # BLD: 7.52 10*3/MM3 (ref 3.4–10.8)

## 2022-05-19 PROCEDURE — 82962 GLUCOSE BLOOD TEST: CPT

## 2022-05-19 PROCEDURE — 80307 DRUG TEST PRSMV CHEM ANLYZR: CPT | Performed by: EMERGENCY MEDICINE

## 2022-05-19 PROCEDURE — 80053 COMPREHEN METABOLIC PANEL: CPT | Performed by: EMERGENCY MEDICINE

## 2022-05-19 PROCEDURE — 85025 COMPLETE CBC W/AUTO DIFF WBC: CPT | Performed by: EMERGENCY MEDICINE

## 2022-05-19 PROCEDURE — U0003 INFECTIOUS AGENT DETECTION BY NUCLEIC ACID (DNA OR RNA); SEVERE ACUTE RESPIRATORY SYNDROME CORONAVIRUS 2 (SARS-COV-2) (CORONAVIRUS DISEASE [COVID-19]), AMPLIFIED PROBE TECHNIQUE, MAKING USE OF HIGH THROUGHPUT TECHNOLOGIES AS DESCRIBED BY CMS-2020-01-R: HCPCS | Performed by: EMERGENCY MEDICINE

## 2022-05-19 PROCEDURE — 99215 OFFICE O/P EST HI 40 MIN: CPT

## 2022-05-19 PROCEDURE — 36415 COLL VENOUS BLD VENIPUNCTURE: CPT

## 2022-05-19 PROCEDURE — 25010000002 LORAZEPAM PER 2 MG: Performed by: EMERGENCY MEDICINE

## 2022-05-19 PROCEDURE — 99284 EMERGENCY DEPT VISIT MOD MDM: CPT

## 2022-05-19 PROCEDURE — 96374 THER/PROPH/DIAG INJ IV PUSH: CPT

## 2022-05-19 PROCEDURE — 82077 ASSAY SPEC XCP UR&BREATH IA: CPT | Performed by: EMERGENCY MEDICINE

## 2022-05-19 RX ORDER — ALUMINA, MAGNESIA, AND SIMETHICONE 2400; 2400; 240 MG/30ML; MG/30ML; MG/30ML
15 SUSPENSION ORAL EVERY 6 HOURS PRN
Status: DISCONTINUED | OUTPATIENT
Start: 2022-05-19 | End: 2022-05-24 | Stop reason: HOSPADM

## 2022-05-19 RX ORDER — IBUPROFEN 400 MG/1
400 TABLET ORAL EVERY 6 HOURS PRN
Status: DISCONTINUED | OUTPATIENT
Start: 2022-05-19 | End: 2022-05-24 | Stop reason: HOSPADM

## 2022-05-19 RX ORDER — ACETAMINOPHEN 325 MG/1
650 TABLET ORAL EVERY 6 HOURS PRN
Status: DISCONTINUED | OUTPATIENT
Start: 2022-05-19 | End: 2022-05-24 | Stop reason: HOSPADM

## 2022-05-19 RX ORDER — GLIPIZIDE 5 MG/1
1.25 TABLET ORAL
Status: DISCONTINUED | OUTPATIENT
Start: 2022-05-20 | End: 2022-05-24 | Stop reason: HOSPADM

## 2022-05-19 RX ORDER — LORAZEPAM 2 MG/ML
1 INJECTION INTRAMUSCULAR ONCE
Status: COMPLETED | OUTPATIENT
Start: 2022-05-19 | End: 2022-05-19

## 2022-05-19 RX ORDER — TRAZODONE HYDROCHLORIDE 50 MG/1
50 TABLET ORAL NIGHTLY PRN
Status: DISCONTINUED | OUTPATIENT
Start: 2022-05-19 | End: 2022-05-24 | Stop reason: HOSPADM

## 2022-05-19 RX ORDER — LOPERAMIDE HYDROCHLORIDE 2 MG/1
2 CAPSULE ORAL
Status: DISCONTINUED | OUTPATIENT
Start: 2022-05-19 | End: 2022-05-24 | Stop reason: HOSPADM

## 2022-05-19 RX ORDER — FAMOTIDINE 20 MG/1
20 TABLET, FILM COATED ORAL 2 TIMES DAILY PRN
Status: DISCONTINUED | OUTPATIENT
Start: 2022-05-19 | End: 2022-05-24 | Stop reason: HOSPADM

## 2022-05-19 RX ORDER — HALOPERIDOL 5 MG/1
5 TABLET ORAL EVERY 4 HOURS PRN
Status: DISCONTINUED | OUTPATIENT
Start: 2022-05-19 | End: 2022-05-24 | Stop reason: HOSPADM

## 2022-05-19 RX ORDER — DIPHENHYDRAMINE HYDROCHLORIDE 50 MG/ML
50 INJECTION INTRAMUSCULAR; INTRAVENOUS EVERY 4 HOURS PRN
Status: DISCONTINUED | OUTPATIENT
Start: 2022-05-19 | End: 2022-05-24 | Stop reason: HOSPADM

## 2022-05-19 RX ORDER — HYDROXYZINE HYDROCHLORIDE 25 MG/1
50 TABLET, FILM COATED ORAL EVERY 6 HOURS PRN
Status: DISCONTINUED | OUTPATIENT
Start: 2022-05-19 | End: 2022-05-24 | Stop reason: HOSPADM

## 2022-05-19 RX ORDER — HALOPERIDOL 5 MG/ML
5 INJECTION INTRAMUSCULAR EVERY 4 HOURS PRN
Status: DISCONTINUED | OUTPATIENT
Start: 2022-05-19 | End: 2022-05-24 | Stop reason: HOSPADM

## 2022-05-19 RX ADMIN — METFORMIN HYDROCHLORIDE 500 MG: 500 TABLET ORAL at 23:44

## 2022-05-19 RX ADMIN — LORAZEPAM 1 MG: 2 INJECTION INTRAMUSCULAR; INTRAVENOUS at 10:20

## 2022-05-19 RX ADMIN — METOPROLOL TARTRATE 12.5 MG: 25 TABLET, FILM COATED ORAL at 23:46

## 2022-05-19 NOTE — ED PROVIDER NOTES
EMERGENCY DEPARTMENT ENCOUNTER    Room Number:  09/09  Date of encounter:  5/19/2022  PCP: Bushra Garza APRN  Historian: Patient,  and daughter    I used full protective equipment while examining this patient.  This includes face mask, gloves and protective eyewear.  I washed my hands before entering the room and immediately upon leaving the room      HPI:  Chief Complaint: Hearing voices  A complete HPI/ROS/PMH/PSH/SH/FH are unobtainable due to: Patient not very cooperative    Context: Mery Beltran is a 56 y.o. female who presents to the ED c/o hearing voices.  Patient has been increasingly agitated and depressed.  According to  she talks about wanting to hurt herself by taking pills.  She has been increasingly paranoid about taking her medications but has been taking medications when forcibly given by other family members.  Patient was seen by primary care provider today and sent to the hospital for further evaluation.  Symptoms are moderate to severe and worsened by nothing, improved by nothing.  Patient was working with her  at [employer] up until early May.  She does not use alcohol or illegal drugs.      MEDICAL RECORD REVIEW  Patient was hospitalized here recently with acute psychosis.  Symptoms were felt to be exacerbated by hyperglycemia and she was placed on metformin and low-dose Glucotrol.  She was also started on Zyprexa.    PAST MEDICAL HISTORY  Active Ambulatory Problems     Diagnosis Date Noted   • Altered mental status, unspecified altered mental status type 05/13/2022   • Auditory hallucinations 05/14/2022   • Type 2 diabetes mellitus with hyperglycemia (HCC) 05/14/2022   • Hypokalemia 05/15/2022   • Psychosis (HCC) 05/15/2022     Resolved Ambulatory Problems     Diagnosis Date Noted   • No Resolved Ambulatory Problems     No Additional Past Medical History         PAST SURGICAL HISTORY  No past surgical history on file.      FAMILY HISTORY  Family History   Problem Relation  Age of Onset   • Heart disease Mother    • Dementia Mother    • Lung disease Father    • No Known Problems Sister    • No Known Problems Brother    • No Known Problems Daughter    • No Known Problems Son    • No Known Problems Son          SOCIAL HISTORY  Social History     Socioeconomic History   • Marital status:    Tobacco Use   • Smoking status: Never Smoker   • Smokeless tobacco: Never Used   Vaping Use   • Vaping Use: Never used   Substance and Sexual Activity   • Alcohol use: Never   • Drug use: Never         ALLERGIES  Patient has no known allergies.       REVIEW OF SYSTEMS  Review of Systems   Constitutional: Negative.  Negative for fever.   HENT: Negative.  Negative for sore throat.    Eyes: Negative.    Respiratory: Negative.  Negative for cough.    Cardiovascular: Negative.  Negative for chest pain.   Gastrointestinal: Negative.    Genitourinary: Negative.  Negative for dysuria.   Musculoskeletal: Negative.  Negative for back pain.   Skin: Negative.  Negative for rash.   Neurological: Negative.  Negative for headaches.   Psychiatric/Behavioral: Positive for agitation, behavioral problems, sleep disturbance and suicidal ideas.   All other systems reviewed and are negative.          PHYSICAL EXAM    I have reviewed the triage vital signs and nursing notes.    ED Triage Vitals [05/19/22 0939]   Temp Heart Rate Resp BP SpO2   98.9 °F (37.2 °C) 94 18 -- 98 %      Temp src Heart Rate Source Patient Position BP Location FiO2 (%)   -- -- -- -- --       Physical Exam  GENERAL: Alert female who seemed anxious and is frequently repeating nonverbal sounds.  Triage vitals reviewed and are unremarkable  HENT: nares patent  EYES: no scleral icterus  CV: regular rhythm, regular rate  RESPIRATORY: normal effort, clear to auscultation bilaterally  ABDOMEN: soft, nontender to palpation  MUSCULOSKELETAL: no deformity-no segment swelling or tenderness to palpation  NEURO: Strength sensation and coordination are  grossly intact.  Speech and mentation are unremarkable  SKIN: warm, dry      LAB RESULTS  Recent Results (from the past 24 hour(s))   Comprehensive Metabolic Panel    Collection Time: 05/19/22 10:20 AM    Specimen: Blood   Result Value Ref Range    Glucose 281 (H) 65 - 99 mg/dL    BUN 14 6 - 20 mg/dL    Creatinine 0.70 0.57 - 1.00 mg/dL    Sodium 136 136 - 145 mmol/L    Potassium 4.0 3.5 - 5.2 mmol/L    Chloride 100 98 - 107 mmol/L    CO2 20.4 (L) 22.0 - 29.0 mmol/L    Calcium 10.0 8.6 - 10.5 mg/dL    Total Protein 7.5 6.0 - 8.5 g/dL    Albumin 5.00 3.50 - 5.20 g/dL    ALT (SGPT) 22 1 - 33 U/L    AST (SGOT) 21 1 - 32 U/L    Alkaline Phosphatase 95 39 - 117 U/L    Total Bilirubin 0.7 0.0 - 1.2 mg/dL    Globulin 2.5 gm/dL    A/G Ratio 2.0 g/dL    BUN/Creatinine Ratio 20.0 7.0 - 25.0    Anion Gap 15.6 (H) 5.0 - 15.0 mmol/L    eGFR 100.4 >60.0 mL/min/1.73   Ethanol    Collection Time: 05/19/22 10:20 AM    Specimen: Blood   Result Value Ref Range    Ethanol <10 0 - 10 mg/dL    Ethanol % <0.010 %   Urine Drug Screen - Urine, Clean Catch    Collection Time: 05/19/22 10:20 AM    Specimen: Urine, Clean Catch   Result Value Ref Range    Amphet/Methamphet, Screen Negative Negative    Barbiturates Screen, Urine Negative Negative    Benzodiazepine Screen, Urine Negative Negative    Cocaine Screen, Urine Negative Negative    Opiate Screen Negative Negative    THC, Screen, Urine Negative Negative    Methadone Screen, Urine Negative Negative    Oxycodone Screen, Urine Negative Negative   CBC Auto Differential    Collection Time: 05/19/22 10:20 AM    Specimen: Blood   Result Value Ref Range    WBC 7.52 3.40 - 10.80 10*3/mm3    RBC 5.31 (H) 3.77 - 5.28 10*6/mm3    Hemoglobin 14.7 12.0 - 15.9 g/dL    Hematocrit 45.5 34.0 - 46.6 %    MCV 85.7 79.0 - 97.0 fL    MCH 27.7 26.6 - 33.0 pg    MCHC 32.3 31.5 - 35.7 g/dL    RDW 12.3 12.3 - 15.4 %    RDW-SD 38.0 37.0 - 54.0 fl    MPV 9.2 6.0 - 12.0 fL    Platelets 255 140 - 450 10*3/mm3     Neutrophil % 63.5 42.7 - 76.0 %    Lymphocyte % 25.7 19.6 - 45.3 %    Monocyte % 9.2 5.0 - 12.0 %    Eosinophil % 0.8 0.3 - 6.2 %    Basophil % 0.5 0.0 - 1.5 %    Immature Grans % 0.3 0.0 - 0.5 %    Neutrophils, Absolute 4.78 1.70 - 7.00 10*3/mm3    Lymphocytes, Absolute 1.93 0.70 - 3.10 10*3/mm3    Monocytes, Absolute 0.69 0.10 - 0.90 10*3/mm3    Eosinophils, Absolute 0.06 0.00 - 0.40 10*3/mm3    Basophils, Absolute 0.04 0.00 - 0.20 10*3/mm3    Immature Grans, Absolute 0.02 0.00 - 0.05 10*3/mm3    nRBC 0.0 0.0 - 0.2 /100 WBC   COVID-19,[facility] IN-HOUSE CEPHEID/ALYCIA NP SWAB IN TRANSPORT MEDIA 8-12 HR TAT - Swab, Nasopharynx    Collection Time: 05/19/22 10:20 AM    Specimen: Nasopharynx; Swab   Result Value Ref Range    COVID19 Not Detected Not Detected - Ref. Range   POC Glucose Once    Collection Time: 05/19/22 11:50 AM    Specimen: Blood   Result Value Ref Range    Glucose 183 (H) 70 - 130 mg/dL       Ordered the above labs and independently reviewed the results.      RADIOLOGY  No Radiology Exams Resulted Within Past 24 Hours    I ordered the above noted radiological studies. Reviewed by me and discussed with radiologist.  See dictation for official radiology interpretation.      PROCEDURES  Procedures      MEDICATIONS GIVEN IN ER    Medications   insulin regular (humuLIN R,novoLIN R) injection 5 Units (0 Units Intravenous Hold 5/19/22 1152)   LORazepam (ATIVAN) injection 1 mg (1 mg Intravenous Given 5/19/22 1020)         PROGRESS, DATA ANALYSIS, CONSULTS, AND MEDICAL DECISION MAKING    All labs have been independently reviewed by me.  All radiology studies have been reviewed by me and discussed with radiologist dictating the report.   EKG's independently viewed and interpreted by me.  Discussion below represents my analysis of pertinent findings related to patient's condition, differential diagnosis, treatment plan and final disposition.      ED Course as of 05/19/22 1241   Thu May 19, 2022   1015 [id number]  female with recent admission for psychosis returns with worsening psychiatric symptoms including increasing paranoia, depression and poor sleep.  On exam patient is agitated and repetitive with nonverbal noises.    Suspect there is worsening of recently diagnosed psychosis.  We will go ahead and check for medical issues including hyperglycemia, infection or anemia.  We will go ahead and give IV Ativan to help with sedation.  Patient will need behavioral health consult and likely behavioral health admission. [DB]   1016 Labs are reviewed and notable for mildly elevated glucose of 281.  We will go ahead and give 5 units of regular insulin to the IV. [DB]   1135 Serum alcohol and urine drug screen are negative.  COVID test is also negative.  At this point we will consult behavioral health for evaluation and possible admission. [DB]   1151 Repeat glucose prior to insulin administration was 183.  We will hold off on insulin at this time. [DB]   1152 On repeat evaluation patient is much calmer and resting quietly after IV Ativan. [DB]   1238 I spoke with our behavioral health intake service and they would like to have patient admitted to a psychiatric inpatient facility.  At this point patient is medically cleared to be admitted to a psychiatric facility. [DB]      ED Course User Index  [DB] Ej Horn MD       AS OF 12:41 EDT VITALS:    BP - 132/84  HR - 82  TEMP - 98.9 °F (37.2 °C)  O2 SATS - 99%      DIAGNOSIS  Final diagnoses:   Acute psychosis (HCC)         DISPOSITION  Anticipate psychiatric transfer as we do not have inpatient psychiatric beds at our hospital.       Ej Horn MD  05/19/22 1464

## 2022-05-19 NOTE — ED NOTES
Patient was seen at PCP for a hospital follow up. Patient arrives to triage via wheelchair crying hysterically. Patient is hearing voices and having suicidal thoughts.
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
## 2022-05-19 NOTE — CONSULTS
"  Access center consult note.    PPE including mask and eyewear worn throughout encounter. Appropriate hand hygiene performed before and after patient contact.    Met with patient in ED room #09. Sitter at bedside. Introduced self and role. Patient is alert and oriented x3. Patient agreed to be evaluated.    Pt. is a 56 y.o.  female who lives at home with spouse and daughter. Orthodoxy: Anglican. Children: 2 adult. Occupation: unemployed. Hobbies: TV, news, exercise, house work/chores. Education: High school. Legal: denies. : Yoav Beltran (spouse) [phone]. : denies. Support system: family. History of violence/trauma/abuse: denies.     Access consulted for psychosis.Pt. presented to ED today in w/c escorted by family from her PCP hospital f/u appt.Pt. presented to PCP office with worsening psychosis symptoms.Pt. was crying hysterically, hearing voices and having suicidal thoughts upon presenting to ED.Family(spouse and daughter) reports pt. had been having increased audio hallucinations, agitation, physical aggression and paranoia.Spouse believes she will hurt herself or someone else.     Pt.previously  admitted 5/13/22 with AMS. Past medical history DM Type 2, hypokalemia. Pt. recently seen by Psychiatrist/Access and mental health resources were provided and recommendations were given to f/u with psychiatry following discharge.    Pt. reports she has not been sleeping at night. Appetite: \"pretty good.\" Spouse reports pt. taking her zyprexa 5mg at bedtime but maximum encouragement required and she is suspicious of taking her medication.    Pt. denies SI/HI or wish to be dead at this time.Spouse reports pt. has been having suicidal thoughts and a plan. Intent/plan to overdose with pills. Pt. reports audio hallucinations are \"sounds and voices that tell her negative and positive things.\" Pt. denies any voices telling her to harm anyone.Pt. denies any visual hallucinations. Pt. denies " any illicit drug or ETOH use.     Spoke with patient and spouse regarding need for admission for mental health treatment. Pt. willing to voluntary admit self at this time. Notified ED physician bed placement will be initiated for mental health treatment.

## 2022-05-19 NOTE — CASE MANAGEMENT/SOCIAL WORK
Spoke to Shanice at [facility]- pt has been accepted by Dr. Shore.  Called nurse, Stephanie, to give this info.  Called MARYJANE Guajardo CCP to let her know that pt has been accepted at [facility] and will need transportation.

## 2022-05-19 NOTE — CASE MANAGEMENT/SOCIAL WORK
Per Lamont at [facility]- it seems that pt's insurance is not in network for mental health coverage.    Faxed info to [facility]- waiting for a response.

## 2022-05-19 NOTE — CASE MANAGEMENT/SOCIAL WORK
Spoke to John at [facility]- no adult beds.    Spoke to Sneha at [facility]- no beds available.    Faxed into to [facility]-per Iqra- pt declined due to her insurance being out of network.    Faxed info to [facility]- per Cat- can't take the pt due to acuity on their unit and the acuity of pt.     Faxed info to [facility] and [facility]- awaiting responses.
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
## 2022-05-20 ENCOUNTER — TELEPHONE (OUTPATIENT)
Dept: INTERNAL MEDICINE | Age: 57
End: 2022-05-20

## 2022-05-20 PROBLEM — I10 HYPERTENSION: Status: ACTIVE | Noted: 2022-05-20

## 2022-05-20 LAB — GLUCOSE BLDC GLUCOMTR-MCNC: 230 MG/DL (ref 70–99)

## 2022-05-20 PROCEDURE — 82962 GLUCOSE BLOOD TEST: CPT

## 2022-05-20 RX ORDER — RISPERIDONE 2 MG/1
2 TABLET ORAL 2 TIMES DAILY
Status: DISCONTINUED | OUTPATIENT
Start: 2022-05-20 | End: 2022-05-24 | Stop reason: HOSPADM

## 2022-05-20 NOTE — TELEPHONE ENCOUNTER
Patient's  came into office requesting a note for his daughter for being off work for 10 days helping patient. Informed patient to see patient access center to help him. Patient was admitted to [facility] after both visit to see Bushra Garza. Patient did not have a form to be completed. Requesting a letter noting patient's daughter is the caregiver. Patients  wasn't really sure what he needed?

## 2022-05-20 NOTE — TELEPHONE ENCOUNTER
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    PLEASE CONTACT MR. LAND -[phone]

## 2022-05-21 LAB — GLUCOSE BLDC GLUCOMTR-MCNC: 304 MG/DL (ref 70–99)

## 2022-05-21 PROCEDURE — 82962 GLUCOSE BLOOD TEST: CPT

## 2022-05-21 RX ORDER — LORAZEPAM 2 MG/ML
2 INJECTION INTRAMUSCULAR EVERY 4 HOURS PRN
Status: DISCONTINUED | OUTPATIENT
Start: 2022-05-21 | End: 2022-05-24 | Stop reason: HOSPADM

## 2022-05-21 RX ORDER — LORAZEPAM 2 MG/1
2 TABLET ORAL EVERY 4 HOURS PRN
Status: DISCONTINUED | OUTPATIENT
Start: 2022-05-21 | End: 2022-05-24 | Stop reason: HOSPADM

## 2022-05-21 RX ORDER — DIPHENHYDRAMINE HCL 50 MG
50 CAPSULE ORAL EVERY 4 HOURS PRN
Status: DISCONTINUED | OUTPATIENT
Start: 2022-05-21 | End: 2022-05-24 | Stop reason: HOSPADM

## 2022-05-21 RX ADMIN — HALOPERIDOL 5 MG: 5 TABLET ORAL at 03:02

## 2022-05-21 RX ADMIN — HYDROXYZINE HYDROCHLORIDE 50 MG: 25 TABLET, FILM COATED ORAL at 03:02

## 2022-05-22 LAB — GLUCOSE BLDC GLUCOMTR-MCNC: 308 MG/DL (ref 70–99)

## 2022-05-22 PROCEDURE — 82962 GLUCOSE BLOOD TEST: CPT

## 2022-05-23 PROCEDURE — 99221 1ST HOSP IP/OBS SF/LOW 40: CPT | Performed by: INTERNAL MEDICINE

## 2022-05-24 VITALS
DIASTOLIC BLOOD PRESSURE: 89 MMHG | OXYGEN SATURATION: 100 % | TEMPERATURE: 98 F | HEIGHT: 59 IN | WEIGHT: 91.8 LBS | SYSTOLIC BLOOD PRESSURE: 154 MMHG | BODY MASS INDEX: 18.51 KG/M2 | HEART RATE: 108 BPM | RESPIRATION RATE: 16 BRPM

## 2022-05-24 RX ORDER — TRAZODONE HYDROCHLORIDE 50 MG/1
50 TABLET ORAL NIGHTLY PRN
Start: 2022-05-24

## 2022-05-24 RX ORDER — RISPERIDONE 2 MG/1
2 TABLET ORAL 2 TIMES DAILY
Start: 2022-05-24

## 2022-06-10 ENCOUNTER — TELEPHONE (OUTPATIENT)
Dept: INTERNAL MEDICINE | Age: 57
End: 2022-06-10

## 2022-06-10 NOTE — TELEPHONE ENCOUNTER
----- Message from MICHI Díaz sent at 6/10/2022  8:14 AM EDT -----  Can you please call and see if pt is able to schedule a follow up visit?

## 2022-06-10 NOTE — TELEPHONE ENCOUNTER
Spoke with patients . Patient is currently in [place]. Patient will schedule a follow up once released.